# Patient Record
Sex: FEMALE | Race: WHITE | NOT HISPANIC OR LATINO | Employment: FULL TIME | URBAN - METROPOLITAN AREA
[De-identification: names, ages, dates, MRNs, and addresses within clinical notes are randomized per-mention and may not be internally consistent; named-entity substitution may affect disease eponyms.]

---

## 2017-03-08 ENCOUNTER — OFFICE VISIT (OUTPATIENT)
Dept: MEDICAL GROUP | Facility: MEDICAL CENTER | Age: 57
End: 2017-03-08
Payer: COMMERCIAL

## 2017-03-08 VITALS
HEART RATE: 87 BPM | HEIGHT: 67 IN | WEIGHT: 155.65 LBS | BODY MASS INDEX: 24.43 KG/M2 | DIASTOLIC BLOOD PRESSURE: 76 MMHG | RESPIRATION RATE: 14 BRPM | OXYGEN SATURATION: 98 % | TEMPERATURE: 99.1 F | SYSTOLIC BLOOD PRESSURE: 118 MMHG

## 2017-03-08 DIAGNOSIS — Z12.11 SCREEN FOR COLON CANCER: ICD-10-CM

## 2017-03-08 DIAGNOSIS — Z12.39 BREAST CANCER SCREENING, HIGH RISK PATIENT: ICD-10-CM

## 2017-03-08 DIAGNOSIS — Z90.710 HISTORY OF HYSTERECTOMY: ICD-10-CM

## 2017-03-08 DIAGNOSIS — Z00.00 PREVENTATIVE HEALTH CARE: ICD-10-CM

## 2017-03-08 DIAGNOSIS — R09.81 NASAL CONGESTION: ICD-10-CM

## 2017-03-08 PROCEDURE — 99204 OFFICE O/P NEW MOD 45 MIN: CPT | Performed by: PHYSICIAN ASSISTANT

## 2017-03-08 RX ORDER — BUDESONIDE 0.5 MG/2ML
INHALANT ORAL
Refills: 0 | COMMUNITY
Start: 2016-12-30 | End: 2018-09-27

## 2017-03-08 ASSESSMENT — PATIENT HEALTH QUESTIONNAIRE - PHQ9: CLINICAL INTERPRETATION OF PHQ2 SCORE: 0

## 2017-03-08 ASSESSMENT — PAIN SCALES - GENERAL: PAINLEVEL: 2=MINIMAL-SLIGHT

## 2017-03-08 NOTE — ASSESSMENT & PLAN NOTE
Has seen Dr. Stewart   Over the last year she has had 3-4 respiratory infections.  During one of him she went to see ENT- who prescribed Pulmicort through [facility] to help with her loss of smell from all the nasal congestion.  She is currently experiencing nasal congestion for the last one week.   Also has popping of the ears and feeling like she is under water.    Has had mild cough, increased mucus mostly clear but thick.    Denies fever, chills, ear pain or pressure, drainage, facial pain or pressure, sore throat, shortness breath, wheezing, chest pain, abdominal discomfort, diarrhea, nausea, vomiting  Has not been treating with anything.  Stated even the Pulmicort she has not tried yet because she was concerned that it is a steroid.

## 2017-03-08 NOTE — PROGRESS NOTES
Chief Complaint   Patient presents with   • Establish Care     HPI:   Nallely Herrera is a 56 y.o. female here to establish care and discuss her nasal congestion    mammo- has never had one.   Colonoscopy- 2006. Normal per pt it wsa for rectal bleeding that has since stopped.   Pap- 2014 normal. (Dr. Elizabet Baer)    [facility]. Last one was 5/2016    Nasal congestion  Has seen Dr. Stewart   Over the last year she has had 3-4 respiratory infections.  During one of him she went to see ENT- who prescribed Pulmicort through [facility] to help with her loss of smell from all the nasal congestion.  She is currently experiencing nasal congestion for the last one week.   Also has popping of the ears and feeling like she is under water.    Has had mild cough, increased mucus mostly clear but thick.    Denies fever, chills, ear pain or pressure, drainage, facial pain or pressure, sore throat, shortness breath, wheezing, chest pain, abdominal discomfort, diarrhea, nausea, vomiting  Has not been treating with anything.  Stated even the Pulmicort she has not tried yet because she was concerned that it is a steroid.      Current medicines (including changes today)  Current Outpatient Prescriptions   Medication Sig Dispense Refill   • budesonide (PULMICORT) 0.5 MG/2ML Suspension USE VIA NEBULIZER TWICE A DAY AS DIRECTED FOR 30 DAYS  0     No current facility-administered medications for this visit.     She  has no past medical history on file.  She  has past surgical history that includes abdominal hysterectomy total (2006).  Social History   Substance Use Topics   • Smoking status: Never Smoker    • Smokeless tobacco: Never Used   • Alcohol Use: Yes      Comment: x3 weekly     Social History     Social History Narrative   • No narrative on file     Family History   Problem Relation Age of Onset   • Stroke Mother    • Cancer Father      prostate ca   • Heart Disease Father      angina   • Diabetes Neg  "Hx    • Hypertension Neg Hx    • Hyperlipidemia Neg Hx      Family Status   Relation Status Death Age   • Mother     • Father     • Sister Alive    • Sister Alive    • Daughter Alive    • Son Alive        ROS  Constitutional: Negative for fever, chills, weight loss  HENT: Negative for ear pain, nosebleeds, + congestion, sore throat and neck pain.   Eyes: Negative for blurred vision.   Respiratory: + cough, sputum production, neg shortness of breath and wheezing.   Cardiovascular: Negative for chest pain, palpitations, orthopnea and leg swelling.   Gastrointestinal: Negative for heartburn, nausea, vomiting and abdominal pain.   Genitourinary: Negative for dysuria, urgency and frequency.   Musculoskeletal: Negative for myalgias, back pain and joint pain.   Skin: Negative for rash and itching.   Neurological: Negative for dizziness, tingling, tremors, sensory change, focal weakness and headaches.   Endo/Heme/Allergies: Does not bruise/bleed easily.   Psychiatric/Behavioral: Negative for depression, anxiety, or memory loss.   All other systems reviewed and are negative except as in HPI.     Objective:     Blood pressure 118/76, pulse 87, temperature 37.3 °C (99.1 °F), resp. rate 14, height 1.702 m (5' 7.01\"), weight 70.6 kg (155 lb 10.3 oz), SpO2 98 %, not currently breastfeeding. Body mass index is 24.37 kg/(m^2).  Physical Exam:    Constitutional: Alert, no distress.  Skin: Warm, dry, good turgor, no rashes in visible areas.  Eye: PERRLA, conjunctiva clear, lids normal.  ENMT: Nares patent, nasal turbinates pink and moist although thick clear mucus noted, TM both pearly gray although left with air-fluid level. No tenderness over sinuses. Lips without lesions, good dentition, oropharynx clear.  Neck: Trachea midline, no masses, no thyromegaly.  Respiratory: Unlabored respiratory effort, lungs clear to auscultation, no wheezes, no ronchi.  Cardiovascular: Normal S1, S2, no murmur, no edema.  Abdomen: " Soft, non-tender, no masses, no hepatosplenomegaly.  Psych: Alert and oriented x3, normal affect and mood.    Assessment and Plan:   The following treatment plan was discussed     1. History of hysterectomy  No treatment needed, continue following with Dr. Baer    2. Breast cancer screening, high risk patient  Mammogram ordered  - MA-SCREEN MAMMO W/CAD-BILAT    3. Screen for colon cancer  Referrals colonoscopy  - REFERRAL TO GI FOR COLONOSCOPY    4. Nasal congestion  Uncertain etiology could be URI or allergy related  Recommend starting nasal saline spray, Mucinex with increased water intake, and Pulmicort with [facility] as well as follow-up with ENT.   We discussed Pulmicort in depth and what type of steroid is, she seems much more happy and much more ease in order to start this medication.      Records requested. Would like to get labs from her work  Followup: Return in about 2 months (around 5/8/2017) for nasal congestion.           Please note that this dictation was created using voice recognition software. I have made every reasonable attempt to correct obvious errors, but I expect that there are errors of grammar and possibly content that I did not discover before finalizing the note.

## 2017-03-08 NOTE — MR AVS SNAPSHOT
"        Nallely Herrera   3/8/2017 9:40 AM   Office Visit   MRN: [id number]    Department:  [place] [address]   Dept Phone:  [phone]    Description:  Female : 1960   Provider:  Nicolle Crockett PA-C           Reason for Visit     Establish Care           Allergies as of 3/8/2017     No Known Allergies      You were diagnosed with     History of hysterectomy   [570021]       Breast cancer screening, high risk patient   [121435]       Screen for colon cancer   [725285]       Preventative health care   [799560]       Nasal congestion   [290948]         Vital Signs     Blood Pressure Pulse Temperature Respirations Height Weight    118/76 mmHg 87 37.3 °C (99.1 °F) 14 1.702 m (5' 7.01\") 70.6 kg (155 lb 10.3 oz)    Body Mass Index Oxygen Saturation Breastfeeding? Smoking Status          24.37 kg/m2 98% No Never Smoker         Basic Information     Date Of Birth Sex Race Ethnicity Preferred Language    1960 Female White Non- English      Your appointments     2017  3:40 PM   Established Patient with Nicolle Crockett PA-C   [facility]    [address]  [address]  [place] [address] [phone]           You will be receiving a confirmation call a few days before your appointment from our automated call confirmation system.              Problem List              ICD-10-CM Priority Class Noted - Resolved    History of hysterectomy Z90.710   3/8/2017 - Present    Preventative health care Z00.00   3/8/2017 - Present    Nasal congestion R09.81   3/8/2017 - Present      Health Maintenance        Date Due Completion Dates    PAP SMEAR 9/10/1983 ---    MAMMOGRAM 9/10/2002 ---    COLONOSCOPY 9/10/2012 ---    IMM INFLUENZA (1) 2016 ---    IMM DTaP/Tdap/Td Vaccine (2 - Td) 2022            Current Immunizations     Hepatitis A Vaccine, Adult 2012    Hepatitis B Vaccine Recombivax (Adol/Adult) 2012    Tdap Vaccine " 11/16/2012    Typhoid Vaccine (Oral) 11/16/2012      Below and/or attached are the medications your provider expects you to take. Review all of your home medications and newly ordered medications with your provider and/or pharmacist. Follow medication instructions as directed by your provider and/or pharmacist. Please keep your medication list with you and share with your provider. Update the information when medications are discontinued, doses are changed, or new medications (including over-the-counter products) are added; and carry medication information at all times in the event of emergency situations     Allergies:  No Known Allergies          Medications  Valid as of: March 08, 2017 - 10:34 AM    Generic Name Brand Name Tablet Size Instructions for use    Budesonide (Suspension) PULMICORT 0.5 MG/2ML USE VIA NEBULIZER TWICE A DAY AS DIRECTED FOR 30 DAYS        .                 Medicines prescribed today were sent to:     [id number] - [place], [place] - [address] [address]    Phone: [phone] Fax: [phone]    Open 24 Hours?: No      Medication refill instructions:       If your prescription bottle indicates you have medication refills left, it is not necessary to call your provider’s office. Please contact your pharmacy and they will refill your medication.    If your prescription bottle indicates you do not have any refills left, you may request refills at any time through one of the following ways: The online [employer] system (except Urgent Care), by calling your provider’s office, or by asking your pharmacy to contact your provider’s office with a refill request. Medication refills are processed only during regular business hours and may not be available until the next business day. Your provider may request additional information or to have a follow-up visit with you prior to refilling your medication.   *Please Note: Medication refills are  assigned a new Rx number when refilled electronically. Your pharmacy may indicate that no refills were authorized even though a new prescription for the same medication is available at the pharmacy. Please request the medicine by name with the pharmacy before contacting your provider for a refill.        Referral     A referral request has been sent to our patient care coordination department. Please allow 3-5 business days for us to process this request and contact you either by phone or mail. If you do not hear from us by the 5th business day, please call us at [phone].           Reachable Access Code: Activation code not generated  Current Reachable Status: Active

## 2017-06-09 ENCOUNTER — APPOINTMENT (OUTPATIENT)
Dept: MEDICAL GROUP | Facility: MEDICAL CENTER | Age: 57
End: 2017-06-09
Payer: COMMERCIAL

## 2018-03-01 ENCOUNTER — OFFICE VISIT (OUTPATIENT)
Dept: MEDICAL GROUP | Facility: LAB | Age: 58
End: 2018-03-01
Payer: COMMERCIAL

## 2018-03-01 VITALS
OXYGEN SATURATION: 94 % | HEART RATE: 84 BPM | HEIGHT: 67 IN | DIASTOLIC BLOOD PRESSURE: 62 MMHG | SYSTOLIC BLOOD PRESSURE: 110 MMHG | WEIGHT: 150 LBS | RESPIRATION RATE: 12 BRPM | BODY MASS INDEX: 23.54 KG/M2 | TEMPERATURE: 98.5 F

## 2018-03-01 DIAGNOSIS — R09.81 NASAL CONGESTION: ICD-10-CM

## 2018-03-01 PROCEDURE — 99214 OFFICE O/P EST MOD 30 MIN: CPT | Performed by: PHYSICIAN ASSISTANT

## 2018-03-01 RX ORDER — AMOXICILLIN AND CLAVULANATE POTASSIUM 875; 125 MG/1; MG/1
1 TABLET, FILM COATED ORAL 2 TIMES DAILY
Qty: 20 TAB | Refills: 0 | Status: SHIPPED | OUTPATIENT
Start: 2018-03-01 | End: 2018-03-11

## 2018-03-01 ASSESSMENT — PATIENT HEALTH QUESTIONNAIRE - PHQ9: CLINICAL INTERPRETATION OF PHQ2 SCORE: 0

## 2018-03-01 ASSESSMENT — PAIN SCALES - GENERAL: PAINLEVEL: NO PAIN

## 2018-03-01 NOTE — PROGRESS NOTES
"Subjective:     Chief Complaint   Patient presents with   • Runny Nose     on and off for that past few year, started again 3 months ago   • Nasal Drainage   • Ear Pain     Nallely Herrera is a 57 y.o. female here today for multiple problems as listed below    Had sx 1 year ago. Resolved.   Then 3 mo ago noticed hard mucus in nostril.   Was treating with nettipot, flonase, humidifier. Helped but still present. Also no longer using the medications.   Today has mild clear nasal congestion, drainage in throat, mild itchy watery eyes.   Will be leaving for [place] in 1 week and is concerned she will get sick or a sinus infection when gone.   Denies fever, chills, headache, ear popping or pain, facial pain, sore throat, cough, shortness of breath, chest pain, abdominal discomfort, nausea.   Pt recalls distant history of allergies when she was living in [place]    Current medicines (including changes today)  Current Outpatient Prescriptions   Medication Sig Dispense Refill   • amoxicillin-clavulanate (AUGMENTIN) 875-125 MG Tab Take 1 Tab by mouth 2 times a day for 10 days. 20 Tab 0   • budesonide (PULMICORT) 0.5 MG/2ML Suspension USE VIA NEBULIZER TWICE A DAY AS DIRECTED FOR 30 DAYS  0     No current facility-administered medications for this visit.      She  has no past medical history on file.    ROS   No chest pain, no shortness of breath, no abdominal pain       Objective:     Blood pressure 110/62, pulse 84, temperature 36.9 °C (98.5 °F), resp. rate 12, height 1.702 m (5' 7\"), weight 68 kg (150 lb), SpO2 94 %. Body mass index is 23.49 kg/m².   Physical Exam:  Alert, oriented in no acute distress.  Eye contact is good, speech goal directed, affect calm  HEENT: conjunctiva non-injected, sclera non-icteric, PERRL.  Pinna normal. TM pearly gray. Nares patent, nasal turbinates pink and boggy with clear mucus noted  Oral mucous membranes pink and moist with no lesions. pnd with clear mucus  Neck No adenopathy or masses " in the neck or supraclavicular regions.  Lungs: clear to auscultation bilaterally with good excursion.  CV: regular rate and rhythm.  Ext: no edema, color normal, peripheral pulses 2+, temperature normal        Assessment and Plan:   The following treatment plan was discussed     Nasal congestion  Most likely allergy related.  Recommend nasal saline spray, Flonase, Mucinex with lots of water, Zyrtec, humidifier.   Also discussed with slight to use Afrin although gave warning not to use more than 3 days.   Did give wait-and-see antibiotic for possible start of sinusitis.   Augmentin- discussed dosage and directions and side effects in depth.    Followup: Return if symptoms worsen or fail to improve.           Please note that this dictation was created using voice recognition software. I have made every reasonable attempt to correct obvious errors, but I expect that there are errors of grammar and possibly content that I did not discover before finalizing the note.

## 2018-09-27 ENCOUNTER — OFFICE VISIT (OUTPATIENT)
Dept: MEDICAL GROUP | Facility: MEDICAL CENTER | Age: 58
End: 2018-09-27
Payer: COMMERCIAL

## 2018-09-27 VITALS
SYSTOLIC BLOOD PRESSURE: 100 MMHG | WEIGHT: 147.6 LBS | TEMPERATURE: 100.2 F | BODY MASS INDEX: 23.17 KG/M2 | HEART RATE: 96 BPM | HEIGHT: 67 IN | OXYGEN SATURATION: 94 % | DIASTOLIC BLOOD PRESSURE: 54 MMHG

## 2018-09-27 DIAGNOSIS — R68.89 FLU-LIKE SYMPTOMS: ICD-10-CM

## 2018-09-27 PROBLEM — Z00.00 PREVENTATIVE HEALTH CARE: Status: RESOLVED | Noted: 2017-03-08 | Resolved: 2018-09-27

## 2018-09-27 PROBLEM — Z90.710 HISTORY OF HYSTERECTOMY: Status: RESOLVED | Noted: 2017-03-08 | Resolved: 2018-09-27

## 2018-09-27 PROBLEM — R09.81 NASAL CONGESTION: Status: RESOLVED | Noted: 2017-03-08 | Resolved: 2018-09-27

## 2018-09-27 LAB
FLUAV+FLUBV AG SPEC QL IA: NORMAL
INT CON NEG: NEGATIVE
INT CON POS: POSITIVE

## 2018-09-27 PROCEDURE — 99214 OFFICE O/P EST MOD 30 MIN: CPT | Performed by: PHYSICIAN ASSISTANT

## 2018-09-27 PROCEDURE — 87804 INFLUENZA ASSAY W/OPTIC: CPT | Performed by: PHYSICIAN ASSISTANT

## 2018-09-27 RX ORDER — CODEINE PHOSPHATE AND GUAIFENESIN 10; 100 MG/5ML; MG/5ML
5 SOLUTION ORAL EVERY 4 HOURS PRN
Qty: 200 ML | Refills: 0 | Status: ON HOLD | OUTPATIENT
Start: 2018-09-27 | End: 2018-10-03

## 2018-09-27 NOTE — LETTER
September 27, 2018        Re: Nallely Herrera          Patient see today for acute respiratory infection. Please excuse from work Tuesday through Friday as she is on treatment and recovers.        Thank you for your time,            Christy Underwood PA-C

## 2018-09-27 NOTE — PROGRESS NOTES
"Subjective:      Nallely Herrera is a 58 y.o. female who presents with Cold Symptoms (body aches, cough - productive, subj fevers)          Chief Complaint   Patient presents with   • Cold Symptoms     body aches, cough - productive, subj fevers       HPIPatient presents with 3 days of cough, body aches, fevers. Son was sick as well, got better within a few days. No recent travel. Not taking any otc meds. Not prone to sinusitis or bronchitis. Feeling a little better today. Cough is painful, non-productive.     ROSno dizziness or weakness. No vision change. No neck pain or stiffness. No n/v/d/c or abdominal pain. No rash or skin lesion. No joint redness or swelling. No urinary complaints    PMHX: negative for heart or lung disease. No diabetes or immune disorder  Meds: pulmicort  nkda  Non-smoker   Objective:     /54 (BP Location: Right arm, Patient Position: Sitting)   Pulse 96   Temp 37.9 °C (100.2 °F)   Ht 1.702 m (5' 7\")   Wt 67 kg (147 lb 9.6 oz)   SpO2 94%   BMI 23.12 kg/m²      Physical Exam   Constitutional: She is oriented to person, place, and time. She appears well-developed and well-nourished. No distress.   HENT:   Head: Normocephalic and atraumatic.   Right Ear: Hearing, tympanic membrane, external ear and ear canal normal.   Left Ear: Hearing, tympanic membrane, external ear and ear canal normal.   Nose: Nose normal. No mucosal edema. Right sinus exhibits no maxillary sinus tenderness and no frontal sinus tenderness. Left sinus exhibits no maxillary sinus tenderness and no frontal sinus tenderness.   Mouth/Throat: Uvula is midline, oropharynx is clear and moist and mucous membranes are normal.   Eyes: Conjunctivae are normal.   Neck: Normal range of motion. Neck supple.   Cardiovascular: Normal rate, regular rhythm and normal heart sounds.    Pulmonary/Chest: Effort normal and breath sounds normal.   Lymphadenopathy:     She has no cervical adenopathy.   Neurological: She is alert and " oriented to person, place, and time.   Skin: Skin is warm and dry. No rash noted. She is not diaphoretic. No pallor.   Psychiatric: She has a normal mood and affect. Her behavior is normal. Judgment and thought content normal.   Vitals reviewed.            POCT influenza negative  Assessment/Plan:     1. Flu-like symptoms  Likely viral illness, no sign/symptoms consistent with bacterial infection  - POCT Influenza A/B    Cheratussin, supportive care, rest. F/u as needed

## 2018-09-30 ENCOUNTER — APPOINTMENT (OUTPATIENT)
Dept: RADIOLOGY | Facility: MEDICAL CENTER | Age: 58
DRG: 193 | End: 2018-09-30
Attending: EMERGENCY MEDICINE
Payer: COMMERCIAL

## 2018-09-30 ENCOUNTER — HOSPITAL ENCOUNTER (INPATIENT)
Facility: MEDICAL CENTER | Age: 58
LOS: 3 days | DRG: 193 | End: 2018-10-03
Attending: EMERGENCY MEDICINE | Admitting: INTERNAL MEDICINE
Payer: COMMERCIAL

## 2018-09-30 DIAGNOSIS — J10.1 INFLUENZA A: ICD-10-CM

## 2018-09-30 DIAGNOSIS — J10.00 PNEUMONIA OF BOTH LUNGS DUE TO INFLUENZA A VIRUS, UNSPECIFIED PART OF LUNG: ICD-10-CM

## 2018-09-30 PROBLEM — J18.9 MULTIFOCAL PNEUMONIA: Status: ACTIVE | Noted: 2018-09-30

## 2018-09-30 PROBLEM — J96.01 ACUTE RESPIRATORY FAILURE WITH HYPOXEMIA (HCC): Status: ACTIVE | Noted: 2018-09-30

## 2018-09-30 LAB
ALBUMIN SERPL BCP-MCNC: 3.7 G/DL (ref 3.2–4.9)
ALBUMIN/GLOB SERPL: 1.1 G/DL
ALP SERPL-CCNC: 64 U/L (ref 30–99)
ALT SERPL-CCNC: 31 U/L (ref 2–50)
ANION GAP SERPL CALC-SCNC: 9 MMOL/L (ref 0–11.9)
AST SERPL-CCNC: 27 U/L (ref 12–45)
BASOPHILS # BLD AUTO: 0.5 % (ref 0–1.8)
BASOPHILS # BLD: 0.03 K/UL (ref 0–0.12)
BILIRUB SERPL-MCNC: 0.7 MG/DL (ref 0.1–1.5)
BNP SERPL-MCNC: 23 PG/ML (ref 0–100)
BUN SERPL-MCNC: 14 MG/DL (ref 8–22)
CALCIUM SERPL-MCNC: 8.6 MG/DL (ref 8.4–10.2)
CHLORIDE SERPL-SCNC: 103 MMOL/L (ref 96–112)
CO2 SERPL-SCNC: 22 MMOL/L (ref 20–33)
CREAT SERPL-MCNC: 0.76 MG/DL (ref 0.5–1.4)
EKG IMPRESSION: NORMAL
EOSINOPHIL # BLD AUTO: 0.04 K/UL (ref 0–0.51)
EOSINOPHIL NFR BLD: 0.7 % (ref 0–6.9)
ERYTHROCYTE [DISTWIDTH] IN BLOOD BY AUTOMATED COUNT: 38 FL (ref 35.9–50)
FLUAV RNA SPEC QL NAA+PROBE: POSITIVE
FLUAV+FLUBV AG SPEC QL IA: NORMAL
FLUBV RNA SPEC QL NAA+PROBE: NEGATIVE
GLOBULIN SER CALC-MCNC: 3.3 G/DL (ref 1.9–3.5)
GLUCOSE SERPL-MCNC: 96 MG/DL (ref 65–99)
HCT VFR BLD AUTO: 39 % (ref 37–47)
HGB BLD-MCNC: 13.5 G/DL (ref 12–16)
IMM GRANULOCYTES # BLD AUTO: 0.01 K/UL (ref 0–0.11)
IMM GRANULOCYTES NFR BLD AUTO: 0.2 % (ref 0–0.9)
LACTATE BLD-SCNC: 1.2 MMOL/L (ref 0.5–2)
LACTATE BLD-SCNC: 1.4 MMOL/L (ref 0.5–2)
LYMPHOCYTES # BLD AUTO: 1.75 K/UL (ref 1–4.8)
LYMPHOCYTES NFR BLD: 32.1 % (ref 22–41)
MCH RBC QN AUTO: 28.9 PG (ref 27–33)
MCHC RBC AUTO-ENTMCNC: 34.6 G/DL (ref 33.6–35)
MCV RBC AUTO: 83.5 FL (ref 81.4–97.8)
MONOCYTES # BLD AUTO: 0.59 K/UL (ref 0–0.85)
MONOCYTES NFR BLD AUTO: 10.8 % (ref 0–13.4)
NEUTROPHILS # BLD AUTO: 3.04 K/UL (ref 2–7.15)
NEUTROPHILS NFR BLD: 55.7 % (ref 44–72)
NRBC # BLD AUTO: 0 K/UL
NRBC BLD-RTO: 0 /100 WBC
PLATELET # BLD AUTO: 261 K/UL (ref 164–446)
PMV BLD AUTO: 10 FL (ref 9–12.9)
POTASSIUM SERPL-SCNC: 3.4 MMOL/L (ref 3.6–5.5)
PROT SERPL-MCNC: 7 G/DL (ref 6–8.2)
RBC # BLD AUTO: 4.67 M/UL (ref 4.2–5.4)
SIGNIFICANT IND 70042: NORMAL
SITE SITE: NORMAL
SODIUM SERPL-SCNC: 134 MMOL/L (ref 135–145)
SOURCE SOURCE: NORMAL
TROPONIN I SERPL-MCNC: <0.02 NG/ML (ref 0–0.04)
WBC # BLD AUTO: 5.5 K/UL (ref 4.8–10.8)

## 2018-09-30 PROCEDURE — 700117 HCHG RX CONTRAST REV CODE 255: Performed by: EMERGENCY MEDICINE

## 2018-09-30 PROCEDURE — 94760 N-INVAS EAR/PLS OXIMETRY 1: CPT

## 2018-09-30 PROCEDURE — 83605 ASSAY OF LACTIC ACID: CPT | Mod: 91

## 2018-09-30 PROCEDURE — 700105 HCHG RX REV CODE 258: Performed by: INTERNAL MEDICINE

## 2018-09-30 PROCEDURE — 304561 HCHG STAT O2

## 2018-09-30 PROCEDURE — 700105 HCHG RX REV CODE 258: Performed by: EMERGENCY MEDICINE

## 2018-09-30 PROCEDURE — 84145 PROCALCITONIN (PCT): CPT

## 2018-09-30 PROCEDURE — 96367 TX/PROPH/DG ADDL SEQ IV INF: CPT

## 2018-09-30 PROCEDURE — 93005 ELECTROCARDIOGRAM TRACING: CPT | Performed by: EMERGENCY MEDICINE

## 2018-09-30 PROCEDURE — 87502 INFLUENZA DNA AMP PROBE: CPT

## 2018-09-30 PROCEDURE — 83880 ASSAY OF NATRIURETIC PEPTIDE: CPT

## 2018-09-30 PROCEDURE — 71046 X-RAY EXAM CHEST 2 VIEWS: CPT

## 2018-09-30 PROCEDURE — 71275 CT ANGIOGRAPHY CHEST: CPT

## 2018-09-30 PROCEDURE — 87040 BLOOD CULTURE FOR BACTERIA: CPT | Mod: 91

## 2018-09-30 PROCEDURE — 80053 COMPREHEN METABOLIC PANEL: CPT

## 2018-09-30 PROCEDURE — 770006 HCHG ROOM/CARE - MED/SURG/GYN SEMI*

## 2018-09-30 PROCEDURE — 96365 THER/PROPH/DIAG IV INF INIT: CPT

## 2018-09-30 PROCEDURE — 84484 ASSAY OF TROPONIN QUANT: CPT

## 2018-09-30 PROCEDURE — 700102 HCHG RX REV CODE 250 W/ 637 OVERRIDE(OP): Performed by: INTERNAL MEDICINE

## 2018-09-30 PROCEDURE — 99285 EMERGENCY DEPT VISIT HI MDM: CPT

## 2018-09-30 PROCEDURE — 700111 HCHG RX REV CODE 636 W/ 250 OVERRIDE (IP): Performed by: INTERNAL MEDICINE

## 2018-09-30 PROCEDURE — 87400 INFLUENZA A/B EACH AG IA: CPT

## 2018-09-30 PROCEDURE — 700111 HCHG RX REV CODE 636 W/ 250 OVERRIDE (IP): Performed by: EMERGENCY MEDICINE

## 2018-09-30 PROCEDURE — 85025 COMPLETE CBC W/AUTO DIFF WBC: CPT

## 2018-09-30 PROCEDURE — 700111 HCHG RX REV CODE 636 W/ 250 OVERRIDE (IP)

## 2018-09-30 PROCEDURE — 99223 1ST HOSP IP/OBS HIGH 75: CPT | Performed by: INTERNAL MEDICINE

## 2018-09-30 PROCEDURE — 700101 HCHG RX REV CODE 250: Performed by: INTERNAL MEDICINE

## 2018-09-30 PROCEDURE — A9270 NON-COVERED ITEM OR SERVICE: HCPCS | Performed by: INTERNAL MEDICINE

## 2018-09-30 RX ORDER — PROMETHAZINE HYDROCHLORIDE 25 MG/1
12.5-25 TABLET ORAL EVERY 4 HOURS PRN
Status: DISCONTINUED | OUTPATIENT
Start: 2018-09-30 | End: 2018-10-03 | Stop reason: HOSPADM

## 2018-09-30 RX ORDER — PROMETHAZINE HYDROCHLORIDE 25 MG/1
12.5-25 SUPPOSITORY RECTAL EVERY 4 HOURS PRN
Status: DISCONTINUED | OUTPATIENT
Start: 2018-09-30 | End: 2018-10-03 | Stop reason: HOSPADM

## 2018-09-30 RX ORDER — CLARITHROMYCIN 500 MG/1
500 TABLET, COATED ORAL 2 TIMES DAILY
Qty: 20 TAB | Refills: 0 | Status: SHIPPED | OUTPATIENT
Start: 2018-09-30 | End: 2018-10-03

## 2018-09-30 RX ORDER — DOXYCYCLINE 100 MG/1
100 TABLET ORAL EVERY 12 HOURS
Status: DISCONTINUED | OUTPATIENT
Start: 2018-09-30 | End: 2018-10-02

## 2018-09-30 RX ORDER — ACETAMINOPHEN 325 MG/1
650 TABLET ORAL EVERY 6 HOURS PRN
Status: DISCONTINUED | OUTPATIENT
Start: 2018-09-30 | End: 2018-10-03 | Stop reason: HOSPADM

## 2018-09-30 RX ORDER — SODIUM CHLORIDE 9 MG/ML
1000 INJECTION, SOLUTION INTRAVENOUS CONTINUOUS
Status: DISCONTINUED | OUTPATIENT
Start: 2018-09-30 | End: 2018-10-03 | Stop reason: HOSPADM

## 2018-09-30 RX ORDER — POLYETHYLENE GLYCOL 3350 17 G/17G
1 POWDER, FOR SOLUTION ORAL
Status: DISCONTINUED | OUTPATIENT
Start: 2018-09-30 | End: 2018-10-03 | Stop reason: HOSPADM

## 2018-09-30 RX ORDER — AMOXICILLIN 250 MG
2 CAPSULE ORAL 2 TIMES DAILY
Status: DISCONTINUED | OUTPATIENT
Start: 2018-09-30 | End: 2018-10-03 | Stop reason: HOSPADM

## 2018-09-30 RX ORDER — AMPICILLIN AND SULBACTAM 2; 1 G/1; G/1
INJECTION, POWDER, FOR SOLUTION INTRAMUSCULAR; INTRAVENOUS
Status: DISPENSED
Start: 2018-09-30 | End: 2018-10-01

## 2018-09-30 RX ORDER — ONDANSETRON 2 MG/ML
4 INJECTION INTRAMUSCULAR; INTRAVENOUS EVERY 4 HOURS PRN
Status: DISCONTINUED | OUTPATIENT
Start: 2018-09-30 | End: 2018-10-03 | Stop reason: HOSPADM

## 2018-09-30 RX ORDER — SODIUM CHLORIDE AND POTASSIUM CHLORIDE 150; 900 MG/100ML; MG/100ML
INJECTION, SOLUTION INTRAVENOUS CONTINUOUS
Status: DISCONTINUED | OUTPATIENT
Start: 2018-09-30 | End: 2018-10-01

## 2018-09-30 RX ORDER — BISACODYL 10 MG
10 SUPPOSITORY, RECTAL RECTAL
Status: DISCONTINUED | OUTPATIENT
Start: 2018-09-30 | End: 2018-10-03 | Stop reason: HOSPADM

## 2018-09-30 RX ORDER — GUAIFENESIN 600 MG/1
1200 TABLET, EXTENDED RELEASE ORAL EVERY 12 HOURS
Status: DISCONTINUED | OUTPATIENT
Start: 2018-09-30 | End: 2018-10-02

## 2018-09-30 RX ORDER — IBUPROFEN 200 MG
400 TABLET ORAL EVERY 6 HOURS PRN
COMMUNITY
End: 2021-11-05

## 2018-09-30 RX ORDER — ONDANSETRON 4 MG/1
4 TABLET, ORALLY DISINTEGRATING ORAL EVERY 4 HOURS PRN
Status: DISCONTINUED | OUTPATIENT
Start: 2018-09-30 | End: 2018-10-03 | Stop reason: HOSPADM

## 2018-09-30 RX ADMIN — HYDROCODONE BITARTRATE AND HOMATROPINE METHYLBROMIDE 5 ML: 5; 1.5 SOLUTION ORAL at 18:27

## 2018-09-30 RX ADMIN — HYDROCODONE BITARTRATE AND HOMATROPINE METHYLBROMIDE 5 ML: 5; 1.5 SOLUTION ORAL at 22:23

## 2018-09-30 RX ADMIN — SODIUM CHLORIDE 3 G: 900 INJECTION INTRAVENOUS at 15:57

## 2018-09-30 RX ADMIN — POTASSIUM CHLORIDE AND SODIUM CHLORIDE: 900; 150 INJECTION, SOLUTION INTRAVENOUS at 18:28

## 2018-09-30 RX ADMIN — GUAIFENESIN 1200 MG: 600 TABLET, EXTENDED RELEASE ORAL at 18:27

## 2018-09-30 RX ADMIN — SODIUM CHLORIDE 3 G: 900 INJECTION INTRAVENOUS at 22:23

## 2018-09-30 RX ADMIN — IOHEXOL 75 ML: 350 INJECTION, SOLUTION INTRAVENOUS at 15:23

## 2018-09-30 RX ADMIN — DOXYCYCLINE 100 MG: 100 TABLET, FILM COATED ORAL at 18:27

## 2018-09-30 RX ADMIN — SODIUM CHLORIDE 1000 ML: 9 INJECTION, SOLUTION INTRAVENOUS at 13:08

## 2018-09-30 RX ADMIN — AZITHROMYCIN MONOHYDRATE 500 MG: 500 INJECTION, POWDER, LYOPHILIZED, FOR SOLUTION INTRAVENOUS at 16:39

## 2018-09-30 ASSESSMENT — ENCOUNTER SYMPTOMS
DIZZINESS: 1
NAUSEA: 1
SHORTNESS OF BREATH: 1
NERVOUS/ANXIOUS: 0
DEPRESSION: 0
VOMITING: 1
SORE THROAT: 1
WEAKNESS: 1
CHILLS: 1
DIAPHORESIS: 1
EYE DISCHARGE: 0
HEADACHES: 1
EYE PAIN: 0
ABDOMINAL PAIN: 1
MYALGIAS: 1
SPUTUM PRODUCTION: 0
WEIGHT LOSS: 1
FEVER: 1
EYE REDNESS: 0
COUGH: 1

## 2018-09-30 ASSESSMENT — LIFESTYLE VARIABLES
EVER_SMOKED: NEVER
ALCOHOL_USE: NO
SUBSTANCE_ABUSE: 0
EVER_SMOKED: NEVER

## 2018-09-30 ASSESSMENT — COPD QUESTIONNAIRES
HAVE YOU SMOKED AT LEAST 100 CIGARETTES IN YOUR ENTIRE LIFE: NO/DON'T KNOW
COPD SCREENING SCORE: 1
DURING THE PAST 4 WEEKS HOW MUCH DID YOU FEEL SHORT OF BREATH: NONE/LITTLE OF THE TIME
DO YOU EVER COUGH UP ANY MUCUS OR PHLEGM?: NO/ONLY WITH OCCASIONAL COLDS OR INFECTIONS

## 2018-09-30 ASSESSMENT — PAIN SCALES - GENERAL
PAINLEVEL_OUTOF10: 0
PAINLEVEL_OUTOF10: 2
PAINLEVEL_OUTOF10: 0
PAINLEVEL_OUTOF10: 0

## 2018-09-30 ASSESSMENT — PATIENT HEALTH QUESTIONNAIRE - PHQ9
1. LITTLE INTEREST OR PLEASURE IN DOING THINGS: NOT AT ALL
2. FEELING DOWN, DEPRESSED, IRRITABLE, OR HOPELESS: NOT AT ALL
SUM OF ALL RESPONSES TO PHQ9 QUESTIONS 1 AND 2: 0

## 2018-09-30 NOTE — ED TRIAGE NOTES
Pt is accompanied by her sister.  She has been experiencing worsening cough, with episodic N/V recurring since this past Monday.

## 2018-09-30 NOTE — ASSESSMENT & PLAN NOTE
Post influenza bacterial superinfection  Ct chest: 2.  Patchy areas of alveolar opacity in the left lower lobe, lingula, and right upper lobe consistent with pneumonia.  Tolerating room  Droplet Precautions while in the hospital  Continue abx de-escalate to oral Augmentin, doxycycline may be making her nauseated peer  Cultures negative to date

## 2018-09-30 NOTE — ED PROVIDER NOTES
ED Provider Note    CHIEF COMPLAINT  Chief Complaint   Patient presents with   • Cough        HPI  Nallely Herrera is a 58 y.o. female who presents to the ED with complaints of shortness of breath weakness dizziness.  Patient states about a week ago she started having some muscle aches and pains and just feeling like she was coming under with a cold.  The patient went to an urgent care couple of days ago in which she had an influenza done that was negative.  The patient states that she is got progressively worse to where now she is describing a cough is progressively worsening and now she coughs to the point where vomiting occurs.  Patient describes tactile fevers chills muscle aches and pain describe some shortness of breath is gradually getting worse over the past couple of days denies any other symptoms.    REVIEW OF SYSTEMS  See HPI for further details. All other systems are negative.     PAST MEDICAL HISTORY  History reviewed. No pertinent past medical history.    FAMILY HISTORY  Family History   Problem Relation Age of Onset   • Stroke Mother    • Cancer Father         prostate ca   • Heart Disease Father         angina   • Diabetes Neg Hx    • Hypertension Neg Hx    • Hyperlipidemia Neg Hx      Patient's family history has been discussed and is been found to be noncontributory to his present illness  SOCIAL HISTORY  Social History     Social History   • Marital status:      Spouse name: N/A   • Number of children: N/A   • Years of education: N/A     Social History Main Topics   • Smoking status: Never Smoker   • Smokeless tobacco: Never Used   • Alcohol use Yes      Comment: Occasionally   • Drug use: Yes     Types: Marijuana      Comment: cbd oil   • Sexual activity: Not Currently     Other Topics Concern   • Not on file     Social History Narrative   • No narrative on file      No primary care provider on file.      SURGICAL HISTORY  Past Surgical History:   Procedure Laterality Date   • ABDOMINAL  "HYSTERECTOMY TOTAL  2006    AUB- still has ovaries.        CURRENT MEDICATIONS  Home Medications     Reviewed by Jorge Jaimes (Pharmacy Tech) on 09/30/18 at 1324  Med List Status: Complete   Medication Last Dose Status   guaifenesin-codeine (ROBITUSSIN AC) Solution oral solution 9/30/2018 Active   ibuprofen (MOTRIN) 200 MG Tab 9/28/2018 Active            None    ALLERGIES  No Known Allergies    PHYSICAL EXAM  VITAL SIGNS: /77   Pulse 88 Comment: O2 restarted.  Temp 37.2 °C (98.9 °F)   Resp (!) 24 Comment: O2 restarted.  Ht 1.702 m (5' 7\")   Wt 66.2 kg (145 lb 15.1 oz)   SpO2 (!) 87% Comment: O2 restarted.  BMI 22.86 kg/m²    Pulse Oximetry was obtained. It showed a reading of Pulse Oximetry: 95 %.  I interpreted this as pulse oximetry in the room was 88-89% on room air 95% on 2 L via nasal cannula.     Constitutional: Well developed, Well nourished, No acute distress, Non-toxic appearance.   HENT: Normocephalic, Atraumatic, Bilateral external ears normal, bilateral tympanic membranes normal, Oropharynx dry mucous membranes, No oral exudates, Nose normal.   Eyes:  conjunctiva is normal, there are no signs of exudate.   Neck: Supple, no cervical lymphadenopathy, no meningeal signs..   Lymphatic: No lymphadenopathy noted.   Cardiovascular: Tachycardic rate regular rhythm without murmurs gallops or rubs  Thorax & Lungs: Diminished breath sounds with consolitary sounds in the right upper lobe region.  No rales or wheezes noted.  Abdomen: Soft nontender nondistended bowel sounds are present  Skin: Warm, Dry, No erythema,   Back: No tenderness, No CVA tenderness.   Extremities: Intact distal pulses, no clubbing, no cyanosis, no edema, nontender.  Negative Homans sign  Neurologic: Alert & oriented x 3, Normal motor function, Normal sensory function, No focal deficits noted.     EKG  Twelve-lead EKG interpreted below by myself    RADIOLOGY/PROCEDURES  CT-CTA CHEST PULMONARY ARTERY W/ RECONS   Final " Result      1.  No evidence of pulmonary embolism.      2.  Patchy areas of alveolar opacity in the left lower lobe, lingula, and right upper lobe consistent with pneumonia.               DX-CHEST-2 VIEWS   Final Result      Negative two views of the chest.          Results for orders placed or performed during the hospital encounter of 09/30/18   LACTIC ACID   Result Value Ref Range    Lactic Acid 1.4 0.5 - 2.0 mmol/L   CBC WITH DIFFERENTIAL   Result Value Ref Range    WBC 5.5 4.8 - 10.8 K/uL    RBC 4.67 4.20 - 5.40 M/uL    Hemoglobin 13.5 12.0 - 16.0 g/dL    Hematocrit 39.0 37.0 - 47.0 %    MCV 83.5 81.4 - 97.8 fL    MCH 28.9 27.0 - 33.0 pg    MCHC 34.6 33.6 - 35.0 g/dL    RDW 38.0 35.9 - 50.0 fL    Platelet Count 261 164 - 446 K/uL    MPV 10.0 9.0 - 12.9 fL    Neutrophils-Polys 55.70 44.00 - 72.00 %    Lymphocytes 32.10 22.00 - 41.00 %    Monocytes 10.80 0.00 - 13.40 %    Eosinophils 0.70 0.00 - 6.90 %    Basophils 0.50 0.00 - 1.80 %    Immature Granulocytes 0.20 0.00 - 0.90 %    Nucleated RBC 0.00 /100 WBC    Neutrophils (Absolute) 3.04 2.00 - 7.15 K/uL    Lymphs (Absolute) 1.75 1.00 - 4.80 K/uL    Monos (Absolute) 0.59 0.00 - 0.85 K/uL    Eos (Absolute) 0.04 0.00 - 0.51 K/uL    Baso (Absolute) 0.03 0.00 - 0.12 K/uL    Immature Granulocytes (abs) 0.01 0.00 - 0.11 K/uL    NRBC (Absolute) 0.00 K/uL   COMP METABOLIC PANEL   Result Value Ref Range    Sodium 134 (L) 135 - 145 mmol/L    Potassium 3.4 (L) 3.6 - 5.5 mmol/L    Chloride 103 96 - 112 mmol/L    Co2 22 20 - 33 mmol/L    Anion Gap 9.0 0.0 - 11.9    Glucose 96 65 - 99 mg/dL    Bun 14 8 - 22 mg/dL    Creatinine 0.76 0.50 - 1.40 mg/dL    Calcium 8.6 8.4 - 10.2 mg/dL    AST(SGOT) 27 12 - 45 U/L    ALT(SGPT) 31 2 - 50 U/L    Alkaline Phosphatase 64 30 - 99 U/L    Total Bilirubin 0.7 0.1 - 1.5 mg/dL    Albumin 3.7 3.2 - 4.9 g/dL    Total Protein 7.0 6.0 - 8.2 g/dL    Globulin 3.3 1.9 - 3.5 g/dL    A-G Ratio 1.1 g/dL   TROPONIN   Result Value Ref Range    Troponin I  <0.02 0.00 - 0.04 ng/mL   BNP   Result Value Ref Range    B Natriuretic Peptide 23 0 - 100 pg/mL   INFLUENZA RAPID   Result Value Ref Range    Significant Indicator NEG     Source RESP     Site Nasopharyngeal     Rapid Influenza A-B       Negative for Influenza A and Influenza B antigens.  Infection due to influenza A or B cannot be ruled out  since the antigen present in the specimen may be below the  detection limit of the test. Culture confirmation of  negative samples is recommended.     INFLUENZA A/B BY PCR   Result Value Ref Range    Influenza virus A RNA POSITIVE (A) Negative    Influenza virus B, PCR Negative Negative   ESTIMATED GFR   Result Value Ref Range    GFR If African American >60 >60 mL/min/1.73 m 2    GFR If Non African American >60 >60 mL/min/1.73 m 2   EKG   Result Value Ref Range    Report       [facility] Emergency Dept.    Test Date:  2018  Pt Name:    KIAN WHYTE             Department[facility]  MRN:        [id number]                      [facility]   Gender:     Female                       Technician: HRR  :        [id number]                   Requested By:JUAN DAVID LAWRENCE  Order #:    [id number]                    Reading MD: JUAN DAVID LAWRENCE MD    Measurements  Intervals                                Axis  Rate:       84                           P:          36  MO:         141                          QRS:        25  QRSD:       92                           T:          62  QT:         360  QTc:        426    Interpretive Statements  Sinus rhythm  No previous ECG available for comparison  nomral appearing ECG  Electronically Signed On 2018 13:18:28 PDT by JUAN DAVID LAWRENCE MD             COURSE & MEDICAL DECISION MAKING  Pertinent Labs & Imaging studies reviewed. (See chart for details)  Patient presents the ED for evaluation.  Clinically patient's oxygen saturations were about 89-91% upon initial evaluation.  She states that she has been  vomiting so I did give her a liter bolus of normal saline because she does appear to be dehydrated she is tachycardic dry mucous membranes.  After fluid hydration she states she is feeling improved however oxygen saturation is dropped to 87%.  Initial chest x-ray looks normal however on clinical evaluation I do feel that she most likely has a good solitary sounds in the left upper lung fields so I did do a CT scan which confirms patchy infiltrates bilateral pneumonia.  PCR influenza did come back as positive so I do for the patient most likely has pneumonia secondary to influenza.  I did start the patient on empiric antibiotics.  After continued observation she still is remaining around 87-89% on room air and at this point she does not feel well enough to be discharge so I will admit the patient for further treatment and care.    FINAL IMPRESSION  1. Influenza A    2. Pneumonia of both lungs due to influenza A virus, unspecified part of lung             Electronically signed by: Dexter Stewart, 9/30/2018 12:53 PM

## 2018-09-30 NOTE — ASSESSMENT & PLAN NOTE
Well past 48-hour window for Tamiflu treatment at the time of admission.  Continue isolation while in the hospital  Supportive treatment

## 2018-09-30 NOTE — H&P
Hospital Medicine History & Physical Note    Date of Service  9/30/2018    Primary Care Physician  No primary care provider on file.    Consultants  none    Code Status  Full    Chief Complaint  Weak    History of Presenting Illness  58 y.o. female recently diagnosed with influenza A -not treated who presented 9/30/2018 with worsening cough, weakness. She first became ill on Monday with feverish feeling, chills, sweats, achy, mild headache, sore throat, rhinitis and congestion.  She had persistent hacking cough and was seen as an outpatient Thursday, rapid flu was negative and she was given Robitussin-AC and Motrin, subsequent PCR has come back positive.  Rather than improving however patient has continued to worsen, he is unable to keep down food due to the persistent coughing she vomits-she has become weak and dizzy, is unable to be up and about for more than a couple of minutes before she is overwhelmingly fatigued and nauseated.  She contacted her sister who is a physician and was brought here.    Review of Systems  Review of Systems   Constitutional: Positive for chills, diaphoresis, fever, malaise/fatigue and weight loss.   HENT: Positive for congestion and sore throat.    Eyes: Negative for pain, discharge and redness.   Respiratory: Positive for cough and shortness of breath. Negative for sputum production.    Cardiovascular: Negative for chest pain.   Gastrointestinal: Positive for abdominal pain (2/2 coughing), nausea and vomiting (2/2 coughing).   Genitourinary: Negative for dysuria and urgency.   Musculoskeletal: Positive for myalgias.   Skin: Negative for itching and rash.   Neurological: Positive for dizziness, weakness and headaches.   Psychiatric/Behavioral: Negative for depression and substance abuse. The patient is not nervous/anxious.        Past Medical History  Denies    Surgical History   has a past surgical history that includes abdominal hysterectomy total (2006). States partial  hyst    Family History  family history includes Cancer in her father; Heart Disease in her father; Stroke in her mother.  No diabetes    Social History   reports that she has never smoked. She has never used smokeless tobacco. She reports that she drinks alcohol. She reports that she uses drugs, including Marijuana.  Social smoker    Allergies  No Known Allergies    Medications  Prior to Admission Medications   Prescriptions Last Dose Informant Patient Reported? Taking?   guaifenesin-codeine (ROBITUSSIN AC) Solution oral solution 9/30/2018 at 1230 Patient No No   Sig: Take 5 mL by mouth every four hours as needed for Cough for up to 10 days.   ibuprofen (MOTRIN) 200 MG Tab 9/28/2018 at PRN Patient Yes Yes   Sig: Take 400 mg by mouth every 6 hours as needed (Pain).      Facility-Administered Medications: None       Physical Exam  Temp:  [37.2 °C (98.9 °F)] 37.2 °C (98.9 °F)  Pulse:  [] 88  Resp:  [17-24] 24  BP: (110)/(77) 110/77    Physical Exam   Constitutional: She is oriented to person, place, and time. She appears well-developed and well-nourished. She appears distressed (Mild due to cough).   HENT:   Head: Normocephalic and atraumatic.   Right Ear: External ear normal.   Left Ear: External ear normal.   Mouth/Throat: No oropharyngeal exudate.   Dry lips mild pharyngeal erythema   Eyes: Pupils are equal, round, and reactive to light. Conjunctivae and EOM are normal. Right eye exhibits no discharge. Left eye exhibits no discharge. No scleral icterus.   Neck: Neck supple.   Cardiovascular: Normal rate and regular rhythm.    Pulmonary/Chest: Breath sounds normal. No respiratory distress (Mild due to coughing). She has no wheezes. She has no rales. She exhibits no tenderness.   Impaired due to persistent hacking cough  No egophony   Abdominal: She exhibits no distension and no mass. There is tenderness (Minimal). There is no rebound and no guarding.   firm due to muscle tension from persistent  cough  Diminished bowel sound   Musculoskeletal: She exhibits no edema or tenderness.   Neurological: She is alert and oriented to person, place, and time. No cranial nerve deficit.   Skin: Skin is warm and dry. She is not diaphoretic.   Psychiatric: She has a normal mood and affect.   Nursing note and vitals reviewed.      Laboratory:  Recent Labs      09/30/18   1310   WBC  5.5   RBC  4.67   HEMOGLOBIN  13.5   HEMATOCRIT  39.0   MCV  83.5   MCH  28.9   MCHC  34.6   RDW  38.0   PLATELETCT  261   MPV  10.0     Recent Labs      09/30/18   1310   SODIUM  134*   POTASSIUM  3.4*   CHLORIDE  103   CO2  22   GLUCOSE  96   BUN  14   CREATININE  0.76   CALCIUM  8.6     Recent Labs      09/30/18   1310   ALTSGPT  31   ASTSGOT  27   ALKPHOSPHAT  64   TBILIRUBIN  0.7   GLUCOSE  96         Recent Labs      09/30/18   1310   BNPBTYPENAT  23         Recent Labs      09/30/18   1310   TROPONINI  <0.02       Urinalysis:    No results found     Imaging:  CT-CTA CHEST PULMONARY ARTERY W/ RECONS   Final Result      1.  No evidence of pulmonary embolism.      2.  Patchy areas of alveolar opacity in the left lower lobe, lingula, and right upper lobe consistent with pneumonia.               DX-CHEST-2 VIEWS   Final Result      Negative two views of the chest.            Assessment/Plan:  I anticipate this patient will require at least two midnights for appropriate medical management, necessitating inpatient admission.    Multifocal pneumonia   Assessment & Plan    Post influenza syndrome  Cover for anaerobes, staph  Unasyn, doxycycline ordered        Influenza A   Assessment & Plan    Well past 48-hour window for Tamiflu treatment  Needs isolation  Supportive treatment          Acute respiratory failure with hypoxemia (HCC)   Assessment & Plan    secondary to post influenza pneumonia  RT protocol, antibiotics, mucolytic            VTE prophylaxis: Lovenox

## 2018-09-30 NOTE — ASSESSMENT & PLAN NOTE
Secondary to post influenza pneumonia  Tolerating room air but still having a lot of coughing.  Mucinex is making her nauseated, changed to Tessalon Perles.  Respiratory therapy protocol

## 2018-10-01 LAB
ANION GAP SERPL CALC-SCNC: 1 MMOL/L (ref 0–11.9)
BASOPHILS # BLD AUTO: 0 % (ref 0–1.8)
BASOPHILS # BLD: 0 K/UL (ref 0–0.12)
BUN SERPL-MCNC: 7 MG/DL (ref 8–22)
CALCIUM SERPL-MCNC: 8.2 MG/DL (ref 8.4–10.2)
CHLORIDE SERPL-SCNC: 110 MMOL/L (ref 96–112)
CO2 SERPL-SCNC: 23 MMOL/L (ref 20–33)
CREAT SERPL-MCNC: 0.68 MG/DL (ref 0.5–1.4)
EOSINOPHIL # BLD AUTO: 0.09 K/UL (ref 0–0.51)
EOSINOPHIL NFR BLD: 2 % (ref 0–6.9)
ERYTHROCYTE [DISTWIDTH] IN BLOOD BY AUTOMATED COUNT: 40.3 FL (ref 35.9–50)
GLUCOSE SERPL-MCNC: 96 MG/DL (ref 65–99)
HCT VFR BLD AUTO: 33.2 % (ref 37–47)
HGB BLD-MCNC: 11.1 G/DL (ref 12–16)
LYMPHOCYTES # BLD AUTO: 2.75 K/UL (ref 1–4.8)
LYMPHOCYTES NFR BLD: 64 % (ref 22–41)
MANUAL DIFF BLD: NORMAL
MCH RBC QN AUTO: 28.9 PG (ref 27–33)
MCHC RBC AUTO-ENTMCNC: 33.4 G/DL (ref 33.6–35)
MCV RBC AUTO: 86.5 FL (ref 81.4–97.8)
MONOCYTES # BLD AUTO: 0.22 K/UL (ref 0–0.85)
MONOCYTES NFR BLD AUTO: 5 % (ref 0–13.4)
NEUTROPHILS # BLD AUTO: 1.25 K/UL (ref 2–7.15)
NEUTROPHILS NFR BLD: 28 % (ref 44–72)
NEUTS BAND NFR BLD MANUAL: 1 % (ref 0–10)
NRBC # BLD AUTO: 0 K/UL
NRBC BLD-RTO: 0 /100 WBC
PLATELET # BLD AUTO: 217 K/UL (ref 164–446)
PLATELET BLD QL SMEAR: NORMAL
PMV BLD AUTO: 10.2 FL (ref 9–12.9)
POTASSIUM SERPL-SCNC: 4.1 MMOL/L (ref 3.6–5.5)
PROCALCITONIN SERPL-MCNC: <0.05 NG/ML
RBC # BLD AUTO: 3.84 M/UL (ref 4.2–5.4)
RBC BLD AUTO: NORMAL
SODIUM SERPL-SCNC: 134 MMOL/L (ref 135–145)
VARIANT LYMPHS BLD QL SMEAR: NORMAL
WBC # BLD AUTO: 4.3 K/UL (ref 4.8–10.8)

## 2018-10-01 PROCEDURE — 700102 HCHG RX REV CODE 250 W/ 637 OVERRIDE(OP): Performed by: INTERNAL MEDICINE

## 2018-10-01 PROCEDURE — 700111 HCHG RX REV CODE 636 W/ 250 OVERRIDE (IP): Performed by: INTERNAL MEDICINE

## 2018-10-01 PROCEDURE — 85007 BL SMEAR W/DIFF WBC COUNT: CPT

## 2018-10-01 PROCEDURE — 700105 HCHG RX REV CODE 258: Performed by: INTERNAL MEDICINE

## 2018-10-01 PROCEDURE — 770006 HCHG ROOM/CARE - MED/SURG/GYN SEMI*

## 2018-10-01 PROCEDURE — 85027 COMPLETE CBC AUTOMATED: CPT

## 2018-10-01 PROCEDURE — A9270 NON-COVERED ITEM OR SERVICE: HCPCS | Performed by: INTERNAL MEDICINE

## 2018-10-01 PROCEDURE — 80048 BASIC METABOLIC PNL TOTAL CA: CPT

## 2018-10-01 PROCEDURE — 99233 SBSQ HOSP IP/OBS HIGH 50: CPT | Performed by: HOSPITALIST

## 2018-10-01 PROCEDURE — 700101 HCHG RX REV CODE 250: Performed by: INTERNAL MEDICINE

## 2018-10-01 RX ORDER — SODIUM CHLORIDE 9 MG/ML
INJECTION, SOLUTION INTRAVENOUS CONTINUOUS
Status: DISCONTINUED | OUTPATIENT
Start: 2018-10-01 | End: 2018-10-03 | Stop reason: HOSPADM

## 2018-10-01 RX ADMIN — HYDROCODONE BITARTRATE AND HOMATROPINE METHYLBROMIDE 5 ML: 5; 1.5 SOLUTION ORAL at 08:37

## 2018-10-01 RX ADMIN — GUAIFENESIN 1200 MG: 600 TABLET, EXTENDED RELEASE ORAL at 05:32

## 2018-10-01 RX ADMIN — DOXYCYCLINE 100 MG: 100 TABLET, FILM COATED ORAL at 05:32

## 2018-10-01 RX ADMIN — HYDROCODONE BITARTRATE AND HOMATROPINE METHYLBROMIDE 5 ML: 5; 1.5 SOLUTION ORAL at 18:11

## 2018-10-01 RX ADMIN — SODIUM CHLORIDE: 9 INJECTION, SOLUTION INTRAVENOUS at 21:21

## 2018-10-01 RX ADMIN — POTASSIUM CHLORIDE AND SODIUM CHLORIDE: 900; 150 INJECTION, SOLUTION INTRAVENOUS at 03:10

## 2018-10-01 RX ADMIN — DOXYCYCLINE 100 MG: 100 TABLET, FILM COATED ORAL at 18:11

## 2018-10-01 RX ADMIN — HYDROCODONE BITARTRATE AND HOMATROPINE METHYLBROMIDE 5 ML: 5; 1.5 SOLUTION ORAL at 03:10

## 2018-10-01 RX ADMIN — GUAIFENESIN 1200 MG: 600 TABLET, EXTENDED RELEASE ORAL at 18:11

## 2018-10-01 RX ADMIN — POTASSIUM CHLORIDE AND SODIUM CHLORIDE: 900; 150 INJECTION, SOLUTION INTRAVENOUS at 12:30

## 2018-10-01 RX ADMIN — SODIUM CHLORIDE 3 G: 900 INJECTION INTRAVENOUS at 18:11

## 2018-10-01 RX ADMIN — HYDROCODONE BITARTRATE AND HOMATROPINE METHYLBROMIDE 5 ML: 5; 1.5 SOLUTION ORAL at 12:37

## 2018-10-01 RX ADMIN — SODIUM CHLORIDE 3 G: 900 INJECTION INTRAVENOUS at 05:32

## 2018-10-01 RX ADMIN — SODIUM CHLORIDE 3 G: 900 INJECTION INTRAVENOUS at 12:30

## 2018-10-01 ASSESSMENT — ENCOUNTER SYMPTOMS
DEPRESSION: 0
COUGH: 1
VOMITING: 0
DOUBLE VISION: 0
NECK PAIN: 0
CLAUDICATION: 0
MYALGIAS: 1
CHILLS: 1
WEAKNESS: 1
DIARRHEA: 0
ABDOMINAL PAIN: 0
TINGLING: 0
HEADACHES: 0
FEVER: 1
NAUSEA: 0
SHORTNESS OF BREATH: 1
BACK PAIN: 0
PHOTOPHOBIA: 0
DIZZINESS: 0
BLURRED VISION: 0

## 2018-10-01 ASSESSMENT — PAIN SCALES - GENERAL
PAINLEVEL_OUTOF10: 0

## 2018-10-01 ASSESSMENT — PATIENT HEALTH QUESTIONNAIRE - PHQ9
2. FEELING DOWN, DEPRESSED, IRRITABLE, OR HOPELESS: NOT AT ALL
1. LITTLE INTEREST OR PLEASURE IN DOING THINGS: NOT AT ALL
SUM OF ALL RESPONSES TO PHQ9 QUESTIONS 1 AND 2: 0

## 2018-10-01 ASSESSMENT — LIFESTYLE VARIABLES: SUBSTANCE_ABUSE: 0

## 2018-10-01 NOTE — PROGRESS NOTES
Pt resting in bed. Denies pain. Generalized weakness. C/o dizziness when ambulating at times, steady gait. Good safety awareness. IV abx continue for PNA. PO intake improving. VSS. Will continue to monitor.

## 2018-10-01 NOTE — PROGRESS NOTES
[facility] Hospitalist Progress Note    Date of Service: 10/1/2018    Chief Complaint  58 y.o. female admitted 2018 with post influenza Multifocal PNA    Interval Problem Update  Patient is very pleasant , still having some SOB, myalgias, and on NC. Otherwise no complaints.    Consultants/Specialty  none    Disposition  Home when medically cleared        Review of Systems   Constitutional: Positive for chills, fever and malaise/fatigue.   HENT: Positive for congestion. Negative for ear discharge, hearing loss and nosebleeds.    Eyes: Negative for blurred vision, double vision and photophobia.   Respiratory: Positive for cough and shortness of breath.    Cardiovascular: Negative for chest pain, claudication and leg swelling.   Gastrointestinal: Negative for abdominal pain, diarrhea, nausea and vomiting.   Genitourinary: Negative for dysuria and urgency.   Musculoskeletal: Positive for myalgias. Negative for back pain and neck pain.   Skin: Negative for itching and rash.   Neurological: Positive for weakness. Negative for dizziness, tingling and headaches.   Psychiatric/Behavioral: Negative for depression, substance abuse and suicidal ideas.      Physical Exam  Laboratory/Imaging   Hemodynamics  Temp (24hrs), Av.1 °C (98.8 °F), Min:36.8 °C (98.3 °F), Max:37.3 °C (99.2 °F)   Temperature: 37 °C (98.6 °F)  Pulse  Av.1  Min: 73  Max: 100 Heart Rate (Monitored): 75  Blood Pressure: (!) 89/66, NIBP: 105/65      Respiratory      Respiration: 18, Pulse Oximetry: 95 %, O2 Daily Delivery Respiratory : Nasal Cannula     Work Of Breathing / Effort: Moderate  RUL Breath Sounds: Diminished, RML Breath Sounds: Diminished, RLL Breath Sounds: Diminished, JOSAFAT Breath Sounds: Diminished, LLL Breath Sounds: Diminished    Fluids    Intake/Output Summary (Last 24 hours) at 10/01/18 0830  Last data filed at 10/01/18 0645   Gross per 24 hour   Intake          2735.42 ml   Output                0 ml   Net          2735.42 ml        Nutrition  Orders Placed This Encounter   Procedures   • Diet Order Regular     Standing Status:   Standing     Number of Occurrences:   1     Order Specific Question:   Diet:     Answer:   Regular [1]     Physical Exam   Constitutional: She is oriented to person, place, and time. She appears well-developed and well-nourished. No distress.   HENT:   Head: Normocephalic and atraumatic.   Mouth/Throat: Oropharynx is clear and moist. No oropharyngeal exudate.   Eyes: Pupils are equal, round, and reactive to light. Conjunctivae and EOM are normal. No scleral icterus.   Neck: Neck supple. No JVD present. No thyromegaly present.   Cardiovascular: Normal rate and intact distal pulses.    No murmur heard.  Pulmonary/Chest: Effort normal. No respiratory distress. She has no wheezes. She has no rales.   diminished   Abdominal: Soft. Bowel sounds are normal. She exhibits no distension. There is no tenderness. There is no rebound.   Musculoskeletal: Normal range of motion. She exhibits no edema or tenderness.   Lymphadenopathy:     She has no cervical adenopathy.   Neurological: She is alert and oriented to person, place, and time. She has normal reflexes. She exhibits normal muscle tone.   Skin: Skin is warm and dry. No rash noted. No erythema. No pallor.   Psychiatric: She has a normal mood and affect. Her behavior is normal. Judgment and thought content normal.       Recent Labs      09/30/18   1310  10/01/18   0527   WBC  5.5  4.3*   RBC  4.67  3.84*   HEMOGLOBIN  13.5  11.1*   HEMATOCRIT  39.0  33.2*   MCV  83.5  86.5   MCH  28.9  28.9   MCHC  34.6  33.4*   RDW  38.0  40.3   PLATELETCT  261  217   MPV  10.0  10.2     Recent Labs      09/30/18   1310  10/01/18   0527   SODIUM  134*  134*   POTASSIUM  3.4*  4.1   CHLORIDE  103  110   CO2  22  23   GLUCOSE  96  96   BUN  14  7*   CREATININE  0.76  0.68   CALCIUM  8.6  8.2*         Recent Labs      09/30/18   1310   BNPBTYPENAT  23              Assessment/Plan      Multifocal pneumonia   Assessment & Plan    Post influenza syndrome  Ct chest: 2.  Patchy areas of alveolar opacity in the left lower lobe, lingula, and right upper lobe consistent with pneumonia.  Continue O2 supplementation, wean as tolerated  Droplet Precautions  Continue abx   Cultures pending          Influenza A   Assessment & Plan    Well past 48-hour window for Tamiflu treatment  Continue isolation  Supportive treatment          Acute respiratory failure with hypoxemia (HCC)   Assessment & Plan    secondary to post influenza pneumonia  RT protocol, antibiotics, mucolytic          Quality-Core Measures   Reviewed items::  Labs reviewed, Medications reviewed and Radiology images reviewed  Mitchell catheter::  No Mitchell  Antibiotics:  Treating active infection/contamination beyond 24 hours perioperative coverage        Discussed plan of care with patient and nursing during morning rounds

## 2018-10-01 NOTE — FLOWSHEET NOTE
09/30/18 2003   Type of Assessment   Assessment Yes   Patient History   Pulmonary Diagnosis none   Surgical Procedures none   Home O2 No   Home Treatments/Frequency No   COPD Risk Screening   Do you have a history of COPD? No   COPD Population Screener   During the past 4 weeks, how much did you feel short of breath? 0   Do you ever cough up any mucus or phlegm? 0   In the past 12 months, you do less than you used to because of your breathing problems 0   Have you smoked at least 100 cigarettes in your entire life? 0   How old are you? 1   COPD Screening Score 1   Smoking History   Have you ever smoked Never   Level Of Consciousness   Level of Consciousness Alert   Respiratory WDL   Respiratory (WDL) X   Chest Exam   Respiration 18   Pulse 83   Breath Sounds   RUL Breath Sounds Diminished   RML Breath Sounds Diminished   RLL Breath Sounds Diminished   JOSAFAT Breath Sounds Diminished   LLL Breath Sounds Diminished   Secretions   Cough Dry;Strong;Non Productive   Oximetry   #Pulse Oximetry (Single Determination) Yes   Oxygen   Pulse Oximetry 96 %   O2 (LPM) 2   O2 Daily Delivery Respiratory  Nasal Cannula   Protocol Pathways   Protocol Pathways Yes   Bronchodilator Protocol   Med Order With RCP No   Is this an exacerbation of COPD/Asthma? No   Bronchodilator Indications Strong Subjective / Objective Improvement   Breath Sounds Criteria 1    Benefit Criteria 0    Pulse Criteria 0    Respiratory Rate Criteria 1    S.O.B. Criteria 1    Criteria Total 3   Point Values 0-3 - PRN   O2 Protocol   O2 Protocol Indications Room Air SpO2 Less Than 90%   PRN oxygen initiated for: SpO2 89-92% on Room Air   O2 Protocol Goals/Outcome Return to home Oxygen therapy baseline

## 2018-10-01 NOTE — PROGRESS NOTES
1905: Bedside report received from MERRICK Grider. Patient resting comfortably in bed. No questions/concerns/needs verbalized at this time. Standard falls precautions remain in place.    2015: Rounded on patient, sitting up in bed, watching TV. Alert and oriented x 4. VSS and afebrile. SpO2 WNL on 2L O2 per NC. LS diminished throughtout. Dry, nonproductive cough. Denies pain/discomfort at this time. IV patent and NS + 20 KCL infusing at 100mL/hr. Ambulating in room independently with steady gait. Plan of care discussed and questions answered. Needs addressed. No further questions or concerns at this time. Droplet precautions maintained. Will continue to monitor.     0235: BP noted to be 89/55. Patient resting in bed, asymptomatic. HR 70s and regular. Dr. Brizuela paged to notify.    0248: Return call received from Dr. Brizuela. Stated to reassess BP in 1 hour. If remains low, will give 500mL NS bolus.    0317: BP 92/58, MAP 69. Patient remains asymptomatic. No bolus per MD. Will continue to monitor at this time.

## 2018-10-01 NOTE — CARE PLAN
Problem: Infection  Goal: Will remain free from infection  Outcome: PROGRESSING AS EXPECTED  Afebrile overnight. Remains on scheduled IV and PO antibiotics.     Problem: Respiratory:  Goal: Respiratory status will improve  Outcome: PROGRESSING AS EXPECTED  No adventitious LS noted overnight, but remain diminished throughout. Dyspneic on exertion, denies SOB at rest. Dry,   nonproductive cough; alleviated with PRN Hydrocodone-homatropine. SpO2 WNL overnight on 2L O2 per NC.

## 2018-10-02 PROBLEM — R11.2 NAUSEA AND VOMITING: Status: ACTIVE | Noted: 2018-10-02

## 2018-10-02 PROCEDURE — 700111 HCHG RX REV CODE 636 W/ 250 OVERRIDE (IP): Performed by: INTERNAL MEDICINE

## 2018-10-02 PROCEDURE — 700105 HCHG RX REV CODE 258: Performed by: EMERGENCY MEDICINE

## 2018-10-02 PROCEDURE — 700105 HCHG RX REV CODE 258: Performed by: INTERNAL MEDICINE

## 2018-10-02 PROCEDURE — 99233 SBSQ HOSP IP/OBS HIGH 50: CPT | Performed by: HOSPITALIST

## 2018-10-02 PROCEDURE — 700102 HCHG RX REV CODE 250 W/ 637 OVERRIDE(OP): Performed by: INTERNAL MEDICINE

## 2018-10-02 PROCEDURE — 770006 HCHG ROOM/CARE - MED/SURG/GYN SEMI*

## 2018-10-02 PROCEDURE — A9270 NON-COVERED ITEM OR SERVICE: HCPCS | Performed by: INTERNAL MEDICINE

## 2018-10-02 PROCEDURE — A9270 NON-COVERED ITEM OR SERVICE: HCPCS | Performed by: HOSPITALIST

## 2018-10-02 PROCEDURE — 700102 HCHG RX REV CODE 250 W/ 637 OVERRIDE(OP): Performed by: HOSPITALIST

## 2018-10-02 RX ORDER — AMOXICILLIN AND CLAVULANATE POTASSIUM 875; 125 MG/1; MG/1
1 TABLET, FILM COATED ORAL EVERY 12 HOURS
Status: DISCONTINUED | OUTPATIENT
Start: 2018-10-02 | End: 2018-10-03 | Stop reason: HOSPADM

## 2018-10-02 RX ORDER — BENZONATATE 100 MG/1
100 CAPSULE ORAL 3 TIMES DAILY PRN
Status: DISCONTINUED | OUTPATIENT
Start: 2018-10-02 | End: 2018-10-03 | Stop reason: HOSPADM

## 2018-10-02 RX ADMIN — HYDROCODONE BITARTRATE AND HOMATROPINE METHYLBROMIDE 5 ML: 5; 1.5 SOLUTION ORAL at 00:44

## 2018-10-02 RX ADMIN — AMOXICILLIN AND CLAVULANATE POTASSIUM 1 TABLET: 875; 125 TABLET, FILM COATED ORAL at 20:35

## 2018-10-02 RX ADMIN — SODIUM CHLORIDE: 9 INJECTION, SOLUTION INTRAVENOUS at 09:20

## 2018-10-02 RX ADMIN — HYDROCODONE BITARTRATE AND HOMATROPINE METHYLBROMIDE 5 ML: 5; 1.5 SOLUTION ORAL at 05:25

## 2018-10-02 RX ADMIN — BENZONATATE 100 MG: 100 CAPSULE ORAL at 17:28

## 2018-10-02 RX ADMIN — SODIUM CHLORIDE 3 G: 900 INJECTION INTRAVENOUS at 05:26

## 2018-10-02 RX ADMIN — AMOXICILLIN AND CLAVULANATE POTASSIUM 1 TABLET: 875; 125 TABLET, FILM COATED ORAL at 11:02

## 2018-10-02 RX ADMIN — SODIUM CHLORIDE 1000 ML: 9 INJECTION, SOLUTION INTRAVENOUS at 17:31

## 2018-10-02 RX ADMIN — BENZONATATE 100 MG: 100 CAPSULE ORAL at 11:02

## 2018-10-02 RX ADMIN — DOXYCYCLINE 100 MG: 100 TABLET, FILM COATED ORAL at 05:25

## 2018-10-02 RX ADMIN — SODIUM CHLORIDE 3 G: 900 INJECTION INTRAVENOUS at 00:43

## 2018-10-02 ASSESSMENT — COGNITIVE AND FUNCTIONAL STATUS - GENERAL
SUGGESTED CMS G CODE MODIFIER MOBILITY: CH
MOBILITY SCORE: 24
SUGGESTED CMS G CODE MODIFIER DAILY ACTIVITY: CH
DAILY ACTIVITIY SCORE: 24

## 2018-10-02 ASSESSMENT — PATIENT HEALTH QUESTIONNAIRE - PHQ9
1. LITTLE INTEREST OR PLEASURE IN DOING THINGS: NOT AT ALL
1. LITTLE INTEREST OR PLEASURE IN DOING THINGS: NOT AT ALL
SUM OF ALL RESPONSES TO PHQ9 QUESTIONS 1 AND 2: 0
SUM OF ALL RESPONSES TO PHQ9 QUESTIONS 1 AND 2: 0
2. FEELING DOWN, DEPRESSED, IRRITABLE, OR HOPELESS: NOT AT ALL
2. FEELING DOWN, DEPRESSED, IRRITABLE, OR HOPELESS: NOT AT ALL

## 2018-10-02 ASSESSMENT — ENCOUNTER SYMPTOMS
LOSS OF CONSCIOUSNESS: 0
ABDOMINAL PAIN: 0
FLANK PAIN: 0
CONSTITUTIONAL NEGATIVE: 1
BRUISES/BLEEDS EASILY: 0
PSYCHIATRIC NEGATIVE: 1
NAUSEA: 1
DEPRESSION: 0
FEVER: 0
BACK PAIN: 0
CHILLS: 0
SHORTNESS OF BREATH: 0
WEIGHT LOSS: 0
HEMOPTYSIS: 0
COUGH: 1
NERVOUS/ANXIOUS: 0
WHEEZING: 0
MYALGIAS: 0
CARDIOVASCULAR NEGATIVE: 1
DIZZINESS: 0
MUSCULOSKELETAL NEGATIVE: 1
VOMITING: 1
NEUROLOGICAL NEGATIVE: 1
SPUTUM PRODUCTION: 0
WEAKNESS: 0

## 2018-10-02 ASSESSMENT — PAIN SCALES - GENERAL
PAINLEVEL_OUTOF10: 0

## 2018-10-02 NOTE — PROGRESS NOTES
[facility] Medicine Daily Progress Note    Date of Service  10/2/2018    Chief Complaint  58 y.o. female admitted 9/30/2018 with worsening cough and weakness following influenza diagnosis.    Hospital Course    58 y.o. female recently diagnosed with influenza A -not treated who presented 9/30/2018 with worsening cough, weakness. She first became ill on Monday with feverish feeling, chills, sweats, achy, mild headache, sore throat, rhinitis and congestion.  She had persistent hacking cough and was seen as an outpatient Thursday, rapid flu was negative and she was given Robitussin-AC and Motrin, subsequent PCR has come back positive.  Rather than improving however patient has continued to worsen, he is unable to keep down food due to the persistent coughing she vomits-she has become weak and dizzy, is unable to be up and about for more than a couple of minutes before she is overwhelmingly fatigued and nauseated.  She contacted her sister who is a physician and was brought here.      Interval Problem Update  She continues to have a dry and hacking cough.  She has had nausea and several episodes of vomiting overnight.  Oral intake is quite poor.  Nausea is relieved by Zofran but thinks it is precipitated by the Mucinex.    Consultants/Specialty  None    Code Status  Full code    Disposition  Home when medically cleared.    Review of Systems  Review of Systems   Constitutional: Negative.  Negative for chills, fever, malaise/fatigue and weight loss.   HENT: Negative.    Respiratory: Positive for cough. Negative for hemoptysis, sputum production, shortness of breath and wheezing.    Cardiovascular: Negative.  Negative for chest pain and leg swelling.   Gastrointestinal: Positive for nausea and vomiting. Negative for abdominal pain.   Genitourinary: Negative.  Negative for dysuria and flank pain.   Musculoskeletal: Negative.  Negative for back pain and myalgias.   Neurological: Negative.  Negative for dizziness, loss of  consciousness and weakness.   Endo/Heme/Allergies: Negative.  Does not bruise/bleed easily.   Psychiatric/Behavioral: Negative.  Negative for depression. The patient is not nervous/anxious.    All other systems reviewed and are negative.       Physical Exam  Temp:  [36.7 °C (98.1 °F)-37.3 °C (99.1 °F)] 37.2 °C (98.9 °F)  Pulse:  [82-89] 82  Resp:  [18] 18  BP: (100-129)/(66-79) 129/79    Physical Exam   Constitutional: She appears well-developed and well-nourished. She appears distressed (Actively coughing).   Patient seen and examined by me.   HENT:   Nose: Nose normal.   Mouth/Throat: Oropharynx is clear and moist. No oropharyngeal exudate.   Eyes: Conjunctivae are normal. Right eye exhibits no discharge. Left eye exhibits no discharge. No scleral icterus.   Neck: No JVD present. No tracheal deviation present.   Cardiovascular: Normal rate, regular rhythm and normal heart sounds.    Pulmonary/Chest: Effort normal. No stridor. No respiratory distress. She has no wheezes. She has rales (Left lower lobe and right middle lobe). She exhibits no tenderness.   Abdominal: Soft. Bowel sounds are normal. She exhibits no distension. There is no tenderness.   Musculoskeletal: She exhibits no edema or tenderness.   Neurological: She is alert. No cranial nerve deficit. She exhibits normal muscle tone.   Skin: Skin is warm and dry. She is not diaphoretic. No pallor.   Psychiatric: She has a normal mood and affect. Her behavior is normal.   Nursing note and vitals reviewed.      Fluids    Intake/Output Summary (Last 24 hours) at 10/02/18 1629  Last data filed at 10/02/18 0600   Gross per 24 hour   Intake             2296 ml   Output                0 ml   Net             2296 ml       Laboratory  Recent Labs      09/30/18   1310  10/01/18   0527   WBC  5.5  4.3*   RBC  4.67  3.84*   HEMOGLOBIN  13.5  11.1*   HEMATOCRIT  39.0  33.2*   MCV  83.5  86.5   MCH  28.9  28.9   MCHC  34.6  33.4*   RDW  38.0  40.3   PLATELETCT  261  217    MPV  10.0  10.2     Recent Labs      09/30/18   1310  10/01/18   0527   SODIUM  134*  134*   POTASSIUM  3.4*  4.1   CHLORIDE  103  110   CO2  22  23   GLUCOSE  96  96   BUN  14  7*   CREATININE  0.76  0.68   CALCIUM  8.6  8.2*         Recent Labs      09/30/18   1310   BNPBTYPENAT  23           Imaging  CT-CTA CHEST PULMONARY ARTERY W/ RECONS   Final Result      1.  No evidence of pulmonary embolism.      2.  Patchy areas of alveolar opacity in the left lower lobe, lingula, and right upper lobe consistent with pneumonia.               DX-CHEST-2 VIEWS   Final Result      Negative two views of the chest.           Assessment/Plan  Nausea and vomiting   Assessment & Plan    Suspect secondary to side effects from either the antibiotics or narcotic-containing cough medicine or guaifenesin or all.  Zofran as needed for nausea.  Change antibiotics as detailed.  Change to Tessalon Perles for coughing.        Multifocal pneumonia   Assessment & Plan      Post influenza bacterial superinfection  Ct chest: 2.  Patchy areas of alveolar opacity in the left lower lobe, lingula, and right upper lobe consistent with pneumonia.  Tolerating room  Droplet Precautions while in the hospital  Continue abx de-escalate to oral Augmentin, doxycycline may be making her nauseated peer  Cultures negative to date        Influenza A   Assessment & Plan    Well past 48-hour window for Tamiflu treatment at the time of admission.  Continue isolation while in the hospital  Supportive treatment            Acute respiratory failure with hypoxemia (HCC)   Assessment & Plan    Secondary to post influenza pneumonia  Tolerating room air but still having a lot of coughing.  Mucinex is making her nauseated, changed to Tessalon Perles.  Respiratory therapy protocol               VTE prophylaxis: Ambulation and SCDs

## 2018-10-02 NOTE — PROGRESS NOTES
Received bedside report from MERRICK Jewell. Plan of care discussed. Safety precautions in place. Call light and personal belongings within reach. Patient has no needs at this time.

## 2018-10-02 NOTE — PROGRESS NOTES
Pt given prn cough medicine as needed. IV fluids infusing. Educated on plan of care. Safety precautions maintained. Weaned to RA. Tolerating well.

## 2018-10-02 NOTE — PROGRESS NOTES
Report received from day shift RN. Pt resting in bed at this time. Call light in reach. Safety precautions maintained. Will continue to monitor.

## 2018-10-02 NOTE — CARE PLAN
Problem: Safety  Goal: Will remain free from injury  Outcome: PROGRESSING AS EXPECTED  Remind patient to use call light and provide assistance. Bed in low position, bed locked, and appropriate alarms set. Patient wearing non-slip socks. Call light and personal belongings are within reach.       Problem: Knowledge Deficit  Goal: Knowledge of the prescribed therapeutic regimen will improve  Outcome: PROGRESSING AS EXPECTED  Encourage patient to ask questions and be involved in plan of care. Provide education on treatment plan, diagnostic testing, and medications; have patient verbalize understanding.

## 2018-10-02 NOTE — PROGRESS NOTES
Resting in bed at this time. +nonproductive dry cough remains. Tessalon perrls with good effect. PO antibiotics, IV fluids continue. Denies pain. Generalized weakness. Will continue to monitor.

## 2018-10-02 NOTE — CARE PLAN
Problem: Communication  Goal: The ability to communicate needs accurately and effectively will improve  Outcome: PROGRESSING AS EXPECTED  Communicates needs appropriately.     Problem: Safety  Goal: Will remain free from injury  Outcome: PROGRESSING AS EXPECTED  Independent in room, steady gait. Good safety awareness.     Problem: Infection  Goal: Will remain free from infection  Outcome: PROGRESSING AS EXPECTED  Droplet precautions in place for + flu A. Continues on IV and PO antibiotics for PNA.

## 2018-10-02 NOTE — ASSESSMENT & PLAN NOTE
Suspect secondary to side effects from either the antibiotics or narcotic-containing cough medicine or guaifenesin or all.  Zofran as needed for nausea.  Change antibiotics as detailed.  Change to Tessalon Perles for coughing.

## 2018-10-03 VITALS
SYSTOLIC BLOOD PRESSURE: 124 MMHG | BODY MASS INDEX: 23.32 KG/M2 | WEIGHT: 148.59 LBS | DIASTOLIC BLOOD PRESSURE: 74 MMHG | HEART RATE: 80 BPM | TEMPERATURE: 98.5 F | HEIGHT: 67 IN | OXYGEN SATURATION: 93 % | RESPIRATION RATE: 18 BRPM

## 2018-10-03 PROCEDURE — 99239 HOSP IP/OBS DSCHRG MGMT >30: CPT | Performed by: HOSPITALIST

## 2018-10-03 PROCEDURE — 700102 HCHG RX REV CODE 250 W/ 637 OVERRIDE(OP): Performed by: HOSPITALIST

## 2018-10-03 PROCEDURE — A9270 NON-COVERED ITEM OR SERVICE: HCPCS | Performed by: HOSPITALIST

## 2018-10-03 RX ORDER — AMOXICILLIN AND CLAVULANATE POTASSIUM 875; 125 MG/1; MG/1
1 TABLET, FILM COATED ORAL EVERY 12 HOURS
Qty: 10 TAB | Refills: 0 | Status: SHIPPED | OUTPATIENT
Start: 2018-10-03 | End: 2018-10-08

## 2018-10-03 RX ORDER — BENZONATATE 100 MG/1
100 CAPSULE ORAL 3 TIMES DAILY PRN
Qty: 60 CAP | Refills: 0 | Status: SHIPPED | OUTPATIENT
Start: 2018-10-03 | End: 2021-11-05

## 2018-10-03 RX ADMIN — AMOXICILLIN AND CLAVULANATE POTASSIUM 1 TABLET: 875; 125 TABLET, FILM COATED ORAL at 05:41

## 2018-10-03 RX ADMIN — BENZONATATE 100 MG: 100 CAPSULE ORAL at 01:59

## 2018-10-03 RX ADMIN — BENZONATATE 100 MG: 100 CAPSULE ORAL at 11:10

## 2018-10-03 ASSESSMENT — PATIENT HEALTH QUESTIONNAIRE - PHQ9
SUM OF ALL RESPONSES TO PHQ9 QUESTIONS 1 AND 2: 0
2. FEELING DOWN, DEPRESSED, IRRITABLE, OR HOPELESS: NOT AT ALL
1. LITTLE INTEREST OR PLEASURE IN DOING THINGS: NOT AT ALL

## 2018-10-03 ASSESSMENT — PAIN SCALES - GENERAL
PAINLEVEL_OUTOF10: 0

## 2018-10-03 NOTE — PROGRESS NOTES
Report given to Fanta SIN. POC discussed. Pt resting comfortably in bed. Safety precautions in place.

## 2018-10-03 NOTE — PROGRESS NOTES
Assessment complete. Due med given. Pt reports no pain or discomfort. Pt has dry non-productive cough. Pt declines cough medication at this time. Safety precautions in place. Call light in reach.

## 2018-10-03 NOTE — PROGRESS NOTES
Care assumed from day shift RN. POC and orders reviewed. Pt resting in bed at this time. Call light in reach. Safety precautions maintained. Will continue to monitor.

## 2018-10-03 NOTE — RESPIRATORY CARE
COPD EDUCATION by COPD CLINICAL EDUCATOR  10/3/2018 at 8:09 AM by Rosey Callejas     Patient reviewed by COPD education team. Patient does not qualify for COPD program.

## 2018-10-03 NOTE — DISCHARGE SUMMARY
Discharge Summary    CHIEF COMPLAINT ON ADMISSION  Chief Complaint   Patient presents with   • Cough       Reason for Admission  cough, N/V     Admission Date  9/30/2018    CODE STATUS  Full Code    HPI & HOSPITAL COURSE  This is a 58 y.o. female here with post influenza multi focal pneumonia.       58 y.o. female recently diagnosed with influenza A -not treated who presented 9/30/2018 with worsening cough, weakness. She first became ill on Monday with feverish feeling, chills, sweats, achy, mild headache, sore throat, rhinitis and congestion.  She had persistent hacking cough and was seen as an outpatient Thursday, rapid flu was negative and she was given Robitussin-AC and Motrin, subsequent PCR has come back positive.  Rather than improving however patient has continued to worsen, he is unable to keep down food due to the persistent coughing she vomits-she has become weak and dizzy, is unable to be up and about for more than a couple of minutes before she is overwhelmingly fatigued and nauseated.  She contacted her sister who is a physician and was brought here.      She was treated with IV unasyn and azithromycin, weaned off oxygen and shortness of breath improved. No fevers or chills. She has a persistent dry hacking cough consistent with a post viral cough. Patient is in good condition and wishes to be discharged home.    Therefore, she is discharged in good and stable condition to home with close outpatient follow-up.    The patient met 2-midnight criteria for an inpatient stay at the time of discharge.    Discharge Date  10/3/18    FOLLOW UP ITEMS POST DISCHARGE  Primary care.    DISCHARGE DIAGNOSES  Active Problems:    Acute respiratory failure with hypoxemia (HCC) POA: Unknown    Influenza A POA: Unknown    Multifocal pneumonia POA: Unknown    Nausea and vomiting POA: Unknown  Resolved Problems:    * No resolved hospital problems. *      FOLLOW UP  No future appointments.  No follow-up provider  specified.    MEDICATIONS ON DISCHARGE     Medication List      START taking these medications      Instructions   amoxicillin-clavulanate 875-125 MG Tabs  Commonly known as:  AUGMENTIN   Take 1 Tab by mouth every 12 hours for 5 days.  Dose:  1 Tab     benzonatate 100 MG Caps  Commonly known as:  TESSALON   Take 1 Cap by mouth 3 times a day as needed for Cough.  Dose:  100 mg     ipratropium 17 MCG/ACT Aers  Commonly known as:  ATROVENT   Inhale 2 Puffs by mouth every 6 hours.  Dose:  2 Puff        CONTINUE taking these medications      Instructions   ibuprofen 200 MG Tabs  Commonly known as:  MOTRIN   Take 400 mg by mouth every 6 hours as needed (Pain).  Dose:  400 mg        STOP taking these medications    guaifenesin-codeine Soln oral solution  Commonly known as:  ROBITUSSIN AC            Allergies  No Known Allergies    DIET  Orders Placed This Encounter   Procedures   • Diet Order Regular     Standing Status:   Standing     Number of Occurrences:   1     Order Specific Question:   Diet:     Answer:   Regular [1]       ACTIVITY  As tolerated.  Weight bearing as tolerated    CONSULTATIONS  None.    PROCEDURES  None.    LABORATORY  Lab Results   Component Value Date    SODIUM 134 (L) 10/01/2018    POTASSIUM 4.1 10/01/2018    CHLORIDE 110 10/01/2018    CO2 23 10/01/2018    GLUCOSE 96 10/01/2018    BUN 7 (L) 10/01/2018    CREATININE 0.68 10/01/2018        Lab Results   Component Value Date    WBC 4.3 (L) 10/01/2018    HEMOGLOBIN 11.1 (L) 10/01/2018    HEMATOCRIT 33.2 (L) 10/01/2018    PLATELETCT 217 10/01/2018        Total time of the discharge process exceeds 32 minutes.

## 2018-10-03 NOTE — PROGRESS NOTES
Hourly rounding complete. Pt medicated for cough per MAR. No other needs at this time. Safety precautions in place. Call light in reach.

## 2018-10-03 NOTE — PROGRESS NOTES
Pt discharged home, accompanied by family. Discharge instructions given and education provided. Pt verbalized understanding of teaching. Belongings sent with patient.

## 2018-10-03 NOTE — DISCHARGE INSTRUCTIONS
Discharge Instructions    Discharged to home by car with self. Discharged via walking, hospital escort: Refused.  Special equipment needed: Not Applicable    Be sure to schedule a follow-up appointment with your primary care doctor or any specialists as instructed.     Discharge Plan:   Influenza Vaccine Indication: Patient Refuses    I understand that a diet low in cholesterol, fat, and sodium is recommended for good health. Unless I have been given specific instructions below for another diet, I accept this instruction as my diet prescription.   Other diet: regular as tolerated.     Special Instructions: None      Influenza, Adult  Influenza, more commonly known as “the flu,” is a viral infection that primarily affects the respiratory tract. The respiratory tract includes organs that help you breathe, such as the lungs, nose, and throat. The flu causes many common cold symptoms, as well as a high fever and body aches.  The flu spreads easily from person to person (is contagious). Getting a flu shot (influenza vaccination) every year is the best way to prevent influenza.  What are the causes?  Influenza is caused by a virus. You can catch the virus by:  · Breathing in droplets from an infected person's cough or sneeze.  · Touching something that was recently contaminated with the virus and then touching your mouth, nose, or eyes.  What increases the risk?  The following factors may make you more likely to get the flu:  · Not cleaning your hands frequently with soap and water or alcohol-based hand .  · Having close contact with many people during cold and flu season.  · Touching your mouth, eyes, or nose without washing or sanitizing your hands first.  · Not drinking enough fluids or not eating a healthy diet.  · Not getting enough sleep or exercise.  · Being under a high amount of stress.  · Not getting a yearly (annual) flu shot.  You may be at a higher risk of complications from the flu, such as a severe  lung infection (pneumonia), if you:  · Are over the age of 65.  · Are pregnant.  · Have a weakened disease-fighting system (immune system). You may have a weakened immune system if you:  ¨ Have HIV or AIDS.  ¨ Are undergoing chemotherapy.  ¨ Are taking medicines that reduce the activity of (suppress) the immune system.  · Have a long-term (chronic) illness, such as heart disease, kidney disease, diabetes, or lung disease.  · Have a liver disorder.  · Are obese.  · Have anemia.  What are the signs or symptoms?  Symptoms of this condition typically last 4-10 days and may include:  · Fever.  · Chills.  · Headache, body aches, or muscle aches.  · Sore throat.  · Cough.  · Runny or congested nose.  · Chest discomfort and cough.  · Poor appetite.  · Weakness or tiredness (fatigue).  · Dizziness.  · Nausea or vomiting.  How is this diagnosed?  This condition may be diagnosed based on your medical history and a physical exam. Your health care provider may do a nose or throat swab test to confirm the diagnosis.  How is this treated?  If influenza is detected early, you can be treated with antiviral medicine that can reduce the length of your illness and the severity of your symptoms. This medicine may be given by mouth (orally) or through an IV tube that is inserted in one of your veins.  The goal of treatment is to relieve symptoms by taking care of yourself at home. This may include taking over-the-counter medicines, drinking plenty of fluids, and adding humidity to the air in your home.  In some cases, influenza goes away on its own. Severe influenza or complications from influenza may be treated in a hospital.  Follow these instructions at home:  · Take over-the-counter and prescription medicines only as told by your health care provider.  · Use a cool mist humidifier to add humidity to the air in your home. This can make breathing easier.  · Rest as needed.  · Drink enough fluid to keep your urine clear or pale  yellow.  · Cover your mouth and nose when you cough or sneeze.  · Wash your hands with soap and water often, especially after you cough or sneeze. If soap and water are not available, use hand .  · Stay home from work or school as told by your health care provider. Unless you are visiting your health care provider, try to avoid leaving home until your fever has been gone for 24 hours without the use of medicine.  · Keep all follow-up visits as told by your health care provider. This is important.  How is this prevented?  · Getting an annual flu shot is the best way to avoid getting the flu. You may get the flu shot in late summer, fall, or winter. Ask your health care provider when you should get your flu shot.  · Wash your hands often or use hand  often.  · Avoid contact with people who are sick during cold and flu season.  · Eat a healthy diet, drink plenty of fluids, get enough sleep, and exercise regularly.  Contact a health care provider if:  · You develop new symptoms.  · You have:  ¨ Chest pain.  ¨ Diarrhea.  ¨ A fever.  · Your cough gets worse.  · You produce more mucus.  · You feel nauseous or you vomit.  Get help right away if:  · You develop shortness of breath or difficulty breathing.  · Your skin or nails turn a bluish color.  · You have severe pain or stiffness in your neck.  · You develop a sudden headache or sudden pain in your face or ear.  · You cannot stop vomiting.  This information is not intended to replace advice given to you by your health care provider. Make sure you discuss any questions you have with your health care provider.  Document Released: 12/15/2001 Document Revised: 05/25/2017 Document Reviewed: 10/11/2016  [employer] Patient Education © 2017 [employer]    Pneumonitis  Pneumonitis is inflammation of the lungs.   CAUSES   Many things can cause pneumonitis. These can include:   · A bacterial or viral infection. Pneumonitis due to an infection is  usually called pneumonia.  · Work-related exposures, including farm and industrial work. Some substances that can cause pneumonitis include asbestos, silica, inhaled acids, or inhaled chlorine gas.    · Repeated exposure to bird feathers, bird feces, or other allergens.    · Medicine such as chemotherapy drugs, certain antibiotics, and some heart medicines.    · Radiation therapy.    · Exposure to mold. A hot tub, sauna, or home humidifier can have mold growing in it, even if it looks clean. The mold can be breathed in through water vapor.  · Breathing (aspirating) stomach contents, food, or liquids into the lungs.    SIGNS AND SYMPTOMS   · Cough.    · Shortness of breath or difficulty breathing.    · Fever.    · Decreased energy.    · Decreased appetite.    DIAGNOSIS   To diagnose pneumonitis, your health care provider will do a complete history and physical exam. Various tests may be ordered, such as:   · Pulmonary function test.    · Chest X-ray.    · CT scan of the lungs.    · Bronchoscopy.    · Lung biopsy.    TREATMENT   Treatment will depend on the cause of the pneumonitis. If the cause is exposure to a substance, avoiding further exposure to that substance will help reduce your symptoms. Possible medical treatments for pneumonitis include:   · Corticosteroid medicine to help decrease inflammation in the lungs.    · Antibiotic medicine to help fight a bacterial lung infection.    · Oxygen therapy if you are having difficulty breathing.    HOME CARE INSTRUCTIONS   · Avoid exposure to any substance identified as the cause of your pneumonitis.    · If you must continue to work with substances that can cause pneumonitis, wear a mask to protect your lungs.    · Only take over-the-counter or prescription medicine as directed by your health care provider.    · Do not smoke.    · If you use inhalers, keep them with you at all times.    · Follow up with your health care provider as directed.    SEEK IMMEDIATE MEDICAL  CARE IF:   · You develop new or increased shortness of breath.    · You develop a blue color (cyanosis) under your fingernails.    · You have a fever.    MAKE SURE YOU:   · Understand these instructions.  · Will watch your condition.  · Will get help right away if you are not doing well or get worse.  This information is not intended to replace advice given to you by your health care provider. Make sure you discuss any questions you have with your health care provider.  Document Released: 06/07/2011 Document Revised: 08/20/2014 Document Reviewed: 06/09/2014  [employer] Interactive Patient Education © 2017 [employer]      · Is patient discharged on Warfarin / Coumadin?   No     Depression / Suicide Risk    As you are discharged from this [facility] facility, it is important to learn how to keep safe from harming yourself.    Recognize the warning signs:  · Abrupt changes in personality, positive or negative- including increase in energy   · Giving away possessions  · Change in eating patterns- significant weight changes-  positive or negative  · Change in sleeping patterns- unable to sleep or sleeping all the time   · Unwillingness or inability to communicate  · Depression  · Unusual sadness, discouragement and loneliness  · Talk of wanting to die  · Neglect of personal appearance   · Rebelliousness- reckless behavior  · Withdrawal from people/activities they love  · Confusion- inability to concentrate     If you or a loved one observes any of these behaviors or has concerns about self-harm, here's what you can do:  · Talk about it- your feelings and reasons for harming yourself  · Remove any means that you might use to hurt yourself (examples: pills, rope, extension cords, firearm)  · Get professional help from the community (Mental Health, Substance Abuse, psychological counseling)  · Do not be alone:Call your Safe Contact- someone whom you trust who will be there for you.  · Call your local CRISIS HOTLINE  655-8673 or 914-621-3901  · Call your local Children's Mobile Crisis Response Team [place] (701) 289-9413 or www.Create.myPizza.com  · Call the toll free National Suicide Prevention Hotlines   · National Suicide Prevention Lifeline 613-956-RYRF (9829)  · National Dedi[employer] Line Network 800-SUICIDE (298-6504)

## 2018-10-03 NOTE — PROGRESS NOTES
Report received from Fanta SIN. POC discussed. Pt resting comfortably in bed. Safety precautions in place.

## 2018-10-05 ENCOUNTER — PATIENT OUTREACH (OUTPATIENT)
Dept: HEALTH INFORMATION MANAGEMENT | Facility: OTHER | Age: 58
End: 2018-10-05

## 2018-10-05 LAB
BACTERIA BLD CULT: NORMAL
BACTERIA BLD CULT: NORMAL
SIGNIFICANT IND 70042: NORMAL
SIGNIFICANT IND 70042: NORMAL
SITE SITE: NORMAL
SITE SITE: NORMAL
SOURCE SOURCE: NORMAL
SOURCE SOURCE: NORMAL

## 2018-10-05 NOTE — PROGRESS NOTES
10/5/18 9:00am:  CM post discharge outreach call first attempt.  VM left requesting return call to .    10/5/18 9:10am: Post discharge call completed.

## 2018-11-07 ENCOUNTER — APPOINTMENT (OUTPATIENT)
Dept: MEDICAL GROUP | Facility: MEDICAL CENTER | Age: 58
End: 2018-11-07
Payer: COMMERCIAL

## 2019-04-01 NOTE — CARE PLAN
Problem: Knowledge Deficit  Goal: Knowledge of disease process/condition, treatment plan, diagnostic tests, and medications will improve  Outcome: PROGRESSING AS EXPECTED  Pt knowledge level assessed. Pt educated on disease process/condition, POC, diagnostic tests, and medications. Pt verbalized understanding of care plan.       Problem: Respiratory:  Goal: Respiratory status will improve  Outcome: PROGRESSING AS EXPECTED  Pt educated on IS and usage encouraged. Pt demonstrates usage. Frequent ambulation encouraged. Cough and deep breathing technique used. Pt demonstrates understanding of respiratory care plan.        Quality 110: Preventive Care And Screening: Influenza Immunization: Influenza Immunization Administered during Influenza season Quality 131: Pain Assessment And Follow-Up: Pain assessment documented as positive using a standardized tool AND a follow-up plan is documented Quality 226: Preventive Care And Screening: Tobacco Use: Screening And Cessation Intervention: Patient screened for tobacco use and is an ex/non-smoker Quality 400a: One-Time Screening For Hepatitis C Virus (Hcv) For All Patients: Patient received one-time screening for HCV infection Quality 130: Documentation Of Current Medications In The Medical Record: Current Medications Documented Quality 431: Preventive Care And Screening: Unhealthy Alcohol Use - Screening: Patient screened for unhealthy alcohol use using a single question and scores less than 2 times per year Quality 111:Pneumonia Vaccination Status For Older Adults: Pneumococcal Vaccination Previously Received Detail Level: Detailed

## 2020-07-06 ENCOUNTER — NON-PROVIDER VISIT (OUTPATIENT)
Dept: OCCUPATIONAL MEDICINE | Facility: CLINIC | Age: 60
End: 2020-07-06

## 2020-07-06 DIAGNOSIS — Z02.89 ENCOUNTER FOR OCCUPATIONAL HEALTH EXAMINATION: Primary | ICD-10-CM

## 2020-07-06 PROCEDURE — 86580 TB INTRADERMAL TEST: CPT | Performed by: NURSE PRACTITIONER

## 2020-07-09 ENCOUNTER — NON-PROVIDER VISIT (OUTPATIENT)
Dept: OCCUPATIONAL MEDICINE | Facility: CLINIC | Age: 60
End: 2020-07-09

## 2020-07-09 LAB — TB WHEAL 3D P 5 TU DIAM: 0 MM

## 2020-12-09 ENCOUNTER — OFFICE VISIT (OUTPATIENT)
Dept: URGENT CARE | Facility: PHYSICIAN GROUP | Age: 60
End: 2020-12-09
Payer: COMMERCIAL

## 2020-12-09 ENCOUNTER — HOSPITAL ENCOUNTER (OUTPATIENT)
Facility: MEDICAL CENTER | Age: 60
End: 2020-12-09
Attending: PHYSICIAN ASSISTANT
Payer: COMMERCIAL

## 2020-12-09 VITALS
OXYGEN SATURATION: 96 % | RESPIRATION RATE: 16 BRPM | DIASTOLIC BLOOD PRESSURE: 82 MMHG | HEIGHT: 65 IN | HEART RATE: 82 BPM | WEIGHT: 145 LBS | TEMPERATURE: 98.5 F | BODY MASS INDEX: 24.16 KG/M2 | SYSTOLIC BLOOD PRESSURE: 118 MMHG

## 2020-12-09 DIAGNOSIS — Z20.822 SUSPECTED COVID-19 VIRUS INFECTION: ICD-10-CM

## 2020-12-09 DIAGNOSIS — Z20.822 EXPOSURE TO COVID-19 VIRUS: ICD-10-CM

## 2020-12-09 DIAGNOSIS — R05.9 COUGH: ICD-10-CM

## 2020-12-09 DIAGNOSIS — B97.89 VIRAL RESPIRATORY ILLNESS: ICD-10-CM

## 2020-12-09 DIAGNOSIS — J98.8 VIRAL RESPIRATORY ILLNESS: ICD-10-CM

## 2020-12-09 LAB — COVID ORDER STATUS COVID19: NORMAL

## 2020-12-09 PROCEDURE — U0003 INFECTIOUS AGENT DETECTION BY NUCLEIC ACID (DNA OR RNA); SEVERE ACUTE RESPIRATORY SYNDROME CORONAVIRUS 2 (SARS-COV-2) (CORONAVIRUS DISEASE [COVID-19]), AMPLIFIED PROBE TECHNIQUE, MAKING USE OF HIGH THROUGHPUT TECHNOLOGIES AS DESCRIBED BY CMS-2020-01-R: HCPCS

## 2020-12-09 PROCEDURE — 99214 OFFICE O/P EST MOD 30 MIN: CPT | Mod: CS | Performed by: PHYSICIAN ASSISTANT

## 2020-12-09 RX ORDER — BENZONATATE 100 MG/1
200 CAPSULE ORAL 3 TIMES DAILY PRN
Qty: 60 CAP | Refills: 0 | Status: SHIPPED | OUTPATIENT
Start: 2020-12-09 | End: 2021-11-05

## 2020-12-09 ASSESSMENT — ENCOUNTER SYMPTOMS
HEADACHES: 1
DIARRHEA: 1
CHILLS: 1
MYALGIAS: 1
NAUSEA: 1
SORE THROAT: 1
FEVER: 1
COUGH: 1

## 2020-12-09 NOTE — PROGRESS NOTES
"Subjective:     Nallely Herrera  is a 60 y.o. female who presents for Headache (Bodyaches, diarrhea, small cough x 4 days.  Exposed to COVID.)    This is a new problem.  Patient presents to urgent care with 4 to 5-day history of low-grade fever, chills, generalized body aches, headache, slight sore throat, slight nonproductive cough, nausea and loose stools.  Patient has had a direct exposure to COVID-19.     Headache   Associated symptoms include coughing, a fever, nausea and a sore throat.         Review of Systems   Constitutional: Positive for chills, fever and malaise/fatigue.   HENT: Positive for congestion and sore throat.    Respiratory: Positive for cough.    Gastrointestinal: Positive for diarrhea and nausea.   Musculoskeletal: Positive for myalgias.   Neurological: Positive for headaches.   All other systems reviewed and are negative.    No Known Allergies  Past medical history, family history, surgical history and social history are reviewed and updated in the record today.     Objective:   BP (!) 98/60   Pulse 82   Temp 36.9 °C (98.5 °F) (Temporal)   Resp 16   Ht 1.651 m (5' 5\")   Wt 65.8 kg (145 lb)   SpO2 96%   BMI 24.13 kg/m²   Physical Exam  Vitals signs reviewed.   Constitutional:       General: She is not in acute distress.     Appearance: She is well-developed. She is not ill-appearing or toxic-appearing.   HENT:      Head: Normocephalic and atraumatic.      Right Ear: Tympanic membrane, ear canal and external ear normal.      Left Ear: Tympanic membrane, ear canal and external ear normal.      Nose: Nose normal.      Mouth/Throat:      Lips: Pink. No lesions.      Mouth: Mucous membranes are moist.      Pharynx: Oropharynx is clear. Uvula midline. No oropharyngeal exudate.   Eyes:      General: Lids are normal.      Extraocular Movements: Extraocular movements intact.      Conjunctiva/sclera: Conjunctivae normal.      Pupils: Pupils are equal, round, and reactive to light.   Neck:      " Musculoskeletal: Normal range of motion and neck supple.   Cardiovascular:      Rate and Rhythm: Normal rate and regular rhythm.      Heart sounds: Normal heart sounds. No murmur. No friction rub. No gallop.    Pulmonary:      Effort: Pulmonary effort is normal. No respiratory distress.      Breath sounds: Normal breath sounds.   Abdominal:      General: Bowel sounds are normal. There is no distension.      Palpations: Abdomen is soft. There is no mass.      Tenderness: There is no abdominal tenderness. There is no guarding or rebound.   Musculoskeletal: Normal range of motion.         General: No tenderness or deformity.   Lymphadenopathy:      Head:      Right side of head: No submental, submandibular or tonsillar adenopathy.      Left side of head: No submental, submandibular or tonsillar adenopathy.      Cervical: No cervical adenopathy.      Upper Body:      Right upper body: No supraclavicular adenopathy.      Left upper body: No supraclavicular adenopathy.   Skin:     General: Skin is warm and dry.      Findings: No rash.   Neurological:      Mental Status: She is alert and oriented to person, place, and time.      Cranial Nerves: Cranial nerves are intact. No cranial nerve deficit.      Sensory: Sensation is intact. No sensory deficit.      Motor: Motor function is intact.      Coordination: Coordination is intact. Coordination normal.      Gait: Gait is intact.   Psychiatric:         Attention and Perception: Attention normal.         Mood and Affect: Mood and affect normal.         Speech: Speech normal.         Behavior: Behavior normal. Behavior is cooperative.         Thought Content: Thought content normal.         Judgment: Judgment normal.          Assessment/Plan:   1. Viral respiratory illness  - COVID/SARS COV-2 PCR; Future    2. Suspected COVID-19 virus infection  - COVID/SARS COV-2 PCR; Future    3. Exposure to COVID-19 virus  - COVID/SARS COV-2 PCR; Future    4. Cough  - benzonatate (TESSALON)  100 MG Cap; Take 2 Caps by mouth 3 times a day as needed.  Dispense: 60 Cap; Refill: 0  - COVID/SARS COV-2 PCR; Future    Testing performed for COVID-19.  Patient/guardian is given printed /MyChart instructions regarding self-isolation.  Work/school note is provided with specific return to work/school protocols.  Reviewed with patient/guardian that if they do test positive they will be contacted by their local health department regarding return to work/school protocols.  Results will also be released to patient/guardian in MyChart or called to the patient/guardian directly.  Encouraged mask use, frequent handwashing, wiping down hard surfaces, etc.    Nasal saline rinse  Over-the-counter Flonase nasal spray per 's instructions  Mucinex as needed  Patient is given Tessalon Perles as needed for cough.  May use Tylenol or ibuprofen over-the-counter as needed for pain or fever not to exceed recommended daily dose.    Upon entering exam room I ensured patient was wearing a mask.  This provider wore appropriate PPE throughout entire visit.  Patient wore mask entire visit except for a brief period while examining oropharynx.    Differential diagnosis, natural history, supportive care, and indications for immediate follow-up discussed.  Red flag warning symptoms and strict ER/follow-up precautions given.  The patient demonstrated a good understanding and agreed with the treatment plan.  Please note that this note was created using voice recognition speech to text software. Every effort has been made to correct obvious errors.  However, I expect there are errors of grammar and possibly context that were not discovered prior to finalizing the note  NEHA Scott PA-C

## 2020-12-09 NOTE — LETTER
December 9, 2020         Patient: Nallely Herrera   YOB: 1960   Date of Visit: 12/9/2020      Your employee was seen in our clinic today.  A concern for COVID-19 has been identified and testing is in progress. We are asking you to excuse absences while following self-isolation protocol per Center for Disease Control (CDC) guidelines.  Your employee will be able to access test results through our electronic delivery system called [employer].     If the results of testing are positive, your employee should follow the CDC guidelines.  These are isolation for a minimum of 10 days and at least 24 hours have passed since your last fever without the use of fever-reducing medications and all other symptoms have improved.  The health department may contact you and provide further directions regarding self-isolation and return to work.     Negative result without close contact*:  If your employee was tested due to their symptoms without close contact to someone with COVID-19 and their test is negative: your employee should not return to work or regular activities until 24 hours after symptoms fully improve. (For example, if patient feels back to normal on Tuesday, should remain isolated through Wednesday).      Negative with close contact*:  If your employee was tested due to close contact to someone, they should self isolate for 14 days after their exposure.    Negative with positive household member  If your employee was due to a positive household member they should still quarantine for a period of 14 days.  The 14 day period begins once the household member is isolated. If unable to quarantine (for example mom from infant), the CDC advises an additional 14-day quarantine period from the COVID-19 household member.   In general, repeat testing is not necessary and not offered through our [facility].     This is the only note that will be provided from [facility] for this visit.  Your employee will  require an appointment with a primary care provider if FMLA or disability forms are required.  If you have any questions please do not hesitate to call me at the phone number listed below.    Sincerely,    CIRILO Fry.-C.  [phone]

## 2020-12-10 LAB
SARS-COV-2 RNA RESP QL NAA+PROBE: DETECTED
SPECIMEN SOURCE: ABNORMAL

## 2021-03-15 DIAGNOSIS — Z23 NEED FOR VACCINATION: ICD-10-CM

## 2021-09-22 ENCOUNTER — OFFICE VISIT (OUTPATIENT)
Dept: URGENT CARE | Facility: PHYSICIAN GROUP | Age: 61
End: 2021-09-22
Payer: COMMERCIAL

## 2021-09-22 ENCOUNTER — HOSPITAL ENCOUNTER (OUTPATIENT)
Facility: MEDICAL CENTER | Age: 61
End: 2021-09-22
Attending: NURSE PRACTITIONER
Payer: COMMERCIAL

## 2021-09-22 VITALS
DIASTOLIC BLOOD PRESSURE: 62 MMHG | WEIGHT: 145 LBS | SYSTOLIC BLOOD PRESSURE: 110 MMHG | HEART RATE: 84 BPM | HEIGHT: 65 IN | TEMPERATURE: 98.6 F | RESPIRATION RATE: 18 BRPM | OXYGEN SATURATION: 97 % | BODY MASS INDEX: 24.16 KG/M2

## 2021-09-22 DIAGNOSIS — J06.9 VIRAL URI WITH COUGH: ICD-10-CM

## 2021-09-22 PROCEDURE — U0005 INFEC AGEN DETEC AMPLI PROBE: HCPCS

## 2021-09-22 PROCEDURE — 99213 OFFICE O/P EST LOW 20 MIN: CPT | Performed by: NURSE PRACTITIONER

## 2021-09-22 PROCEDURE — U0003 INFECTIOUS AGENT DETECTION BY NUCLEIC ACID (DNA OR RNA); SEVERE ACUTE RESPIRATORY SYNDROME CORONAVIRUS 2 (SARS-COV-2) (CORONAVIRUS DISEASE [COVID-19]), AMPLIFIED PROBE TECHNIQUE, MAKING USE OF HIGH THROUGHPUT TECHNOLOGIES AS DESCRIBED BY CMS-2020-01-R: HCPCS

## 2021-09-22 ASSESSMENT — ENCOUNTER SYMPTOMS
SHORTNESS OF BREATH: 0
COUGH: 1
NAUSEA: 1
SORE THROAT: 1
DIARRHEA: 0
VOMITING: 0
FEVER: 0

## 2021-09-23 DIAGNOSIS — J06.9 VIRAL URI WITH COUGH: ICD-10-CM

## 2021-09-23 LAB — COVID ORDER STATUS COVID19: NORMAL

## 2021-09-23 ASSESSMENT — ENCOUNTER SYMPTOMS: MYALGIAS: 1

## 2021-09-23 NOTE — PROGRESS NOTES
Subjective:     Nallely Herrera is a 61 y.o. female who presents for Cough (cough since sunday night, no fever, body aches needs test for work. )      Started on Sunday night. Had COVID in Dec. Is Vaccinated. Hx of flem and nausea contributing to a cough.           Cough  This is a new problem. The current episode started in the past 7 days. The problem has been gradually improving. Associated symptoms include myalgias and a sore throat. Pertinent negatives include no fever or shortness of breath. Nothing aggravates the symptoms.       History reviewed. No pertinent past medical history.    Past Surgical History:   Procedure Laterality Date   • ABDOMINAL HYSTERECTOMY TOTAL  2006    AUB- still has ovaries.        Social History     Socioeconomic History   • Marital status:      Spouse name: Not on file   • Number of children: Not on file   • Years of education: Not on file   • Highest education level: Not on file   Occupational History   • Not on file   Tobacco Use   • Smoking status: Never Smoker   • Smokeless tobacco: Never Used   Substance and Sexual Activity   • Alcohol use: Yes     Comment: Occasionally   • Drug use: Yes     Types: Marijuana     Comment: cbd oil   • Sexual activity: Not Currently   Other Topics Concern   • Not on file   Social History Narrative   • Not on file     Social Determinants of Health     Financial Resource Strain:    • Difficulty of Paying Living Expenses:    Food Insecurity:    • Worried About Running Out of Food in the Last Year:    • Ran Out of Food in the Last Year:    Transportation Needs:    • Lack of Transportation (Medical):    • Lack of Transportation (Non-Medical):    Physical Activity:    • Days of Exercise per Week:    • Minutes of Exercise per Session:    Stress:    • Feeling of Stress :    Social Connections:    • Frequency of Communication with Friends and Family:    • Frequency of Social Gatherings with Friends and Family:    • Attends Christianity Services:   "  • Active Member of Clubs or Organizations:    • Attends Club or Organization Meetings:    • Marital Status:    Intimate Partner Violence:    • Fear of Current or Ex-Partner:    • Emotionally Abused:    • Physically Abused:    • Sexually Abused:         Family History   Problem Relation Age of Onset   • Stroke Mother    • Cancer Father         prostate ca   • Heart Disease Father         angina   • Diabetes Neg Hx    • Hypertension Neg Hx    • Hyperlipidemia Neg Hx         No Known Allergies    Review of Systems   Constitutional: Positive for malaise/fatigue. Negative for fever.   HENT: Positive for congestion and sore throat.    Respiratory: Positive for cough. Negative for shortness of breath.    Gastrointestinal: Positive for nausea. Negative for diarrhea and vomiting.   Musculoskeletal: Positive for myalgias.   All other systems reviewed and are negative.       Objective:   /62   Pulse 84   Temp 37 °C (98.6 °F) (Temporal)   Resp 18   Ht 1.651 m (5' 5\")   Wt 65.8 kg (145 lb)   SpO2 97%   BMI 24.13 kg/m²     Physical Exam  Vitals reviewed.   Constitutional:       General: She is not in acute distress.     Appearance: She is well-developed. She is not toxic-appearing.   HENT:      Head: Normocephalic and atraumatic.      Right Ear: External ear normal. No drainage, swelling or tenderness. Tympanic membrane is not erythematous.      Left Ear: External ear normal. No drainage, swelling or tenderness. A middle ear effusion is present. There is no impacted cerumen. No mastoid tenderness. No hemotympanum. Tympanic membrane is not injected, perforated, erythematous, retracted or bulging.      Ears:      Comments: Part cerumen occlusion right ear, clear effusion left.      Nose: Mucosal edema present.      Mouth/Throat:      Lips: Pink.      Mouth: Mucous membranes are moist.      Pharynx: Oropharynx is clear.   Eyes:      Conjunctiva/sclera: Conjunctivae normal.   Cardiovascular:      Rate and Rhythm: " Normal rate.   Pulmonary:      Effort: Pulmonary effort is normal. No respiratory distress.      Breath sounds: Normal breath sounds. No stridor. No wheezing, rhonchi or rales.   Musculoskeletal:         General: Normal range of motion.      Cervical back: Normal range of motion.   Skin:     General: Skin is warm and dry.      Findings: No rash.   Neurological:      General: No focal deficit present.      Mental Status: She is alert and oriented to person, place, and time.      GCS: GCS eye subscore is 4. GCS verbal subscore is 5. GCS motor subscore is 6.   Psychiatric:         Mood and Affect: Mood normal.         Speech: Speech normal.         Behavior: Behavior normal.         Thought Content: Thought content normal.         Judgment: Judgment normal.         Assessment/Plan:   1. Viral URI with cough  - SARS-CoV-2 PCR (24 hour In-House): Collect [facility] swab in [facility]; Future  Symptomatic care.  -Oral hydration and rest.   -Cough control: nonpharmacologic options for cough relief such as throat lozenges, hot tea, honey.  -Over the counter expectorant as directed; Guaifenesin (Mucinex).  -Tylenol or ibuprofen for pain and fever as directed.     Seek emergency medical care immediately for: Trouble breathing, persistent pain or pressure in the chest, confusion, inability to wake or stay awake, bluish lips or face, persistent tachycardia (fast heart rate), prolonged dizziness, persistent high grade fevers. Follow up for prolonged cough, persistent wheezing, leg swelling, or any other concerns. Follow up with PCP.     -Discussed viral etiology. COVID S&S, and self isolation guidelines. S&S of PNA with follow up.     Differential diagnosis, natural history, supportive care, and indications for immediate follow-up discussed.

## 2021-09-23 NOTE — PATIENT INSTRUCTIONS
Symptomatic care.  -Oral hydration and rest.   -Cough control: nonpharmacologic options for cough relief such as throat lozenges, hot tea, honey.  -Over the counter expectorant as directed; Guaifenesin (Mucinex).  -Tylenol or ibuprofen for pain and fever as directed.     Seek emergency medical care immediately for: Trouble breathing, persistent pain or pressure in the chest, confusion, inability to wake or stay awake, bluish lips or face, persistent tachycardia (fast heart rate), prolonged dizziness, persistent high grade fevers. Follow up for prolonged cough, persistent wheezing, leg swelling, or any other concerns. Follow up with PCP.       Viral Respiratory Infection  A respiratory infection is an illness that affects part of the respiratory system, such as the lungs, nose, or throat. A respiratory infection that is caused by a virus is called a viral respiratory infection.  Common types of viral respiratory infections include:  · A cold.  · The flu (influenza).  · A respiratory syncytial virus (RSV) infection.  What are the causes?  This condition is caused by a virus.  What are the signs or symptoms?  Symptoms of this condition include:  · A stuffy or runny nose.  · Yellow or green nasal discharge.  · A cough.  · Sneezing.  · Fatigue.  · Achy muscles.  · A sore throat.  · Sweating or chills.  · A fever.  · A headache.  How is this diagnosed?  This condition may be diagnosed based on:  · Your symptoms.  · A physical exam.  · Testing of nasal swabs.  How is this treated?  This condition may be treated with medicines, such as:  · Antiviral medicine. This may shorten the length of time a person has symptoms.  · Expectorants. These make it easier to cough up mucus.  · Decongestant nasal sprays.  · Acetaminophen or NSAIDs to relieve fever and pain.  Antibiotic medicines are not prescribed for viral infections. This is because antibiotics are designed to kill bacteria. They are not effective against viruses.  Follow  these instructions at home:    Managing pain and congestion  · Take over-the-counter and prescription medicines only as told by your health care provider.  · If you have a sore throat, gargle with a salt-water mixture 3-4 times a day or as needed. To make a salt-water mixture, completely dissolve ½-1 tsp of salt in 1 cup of warm water.  · Use nose drops made from salt water to ease congestion and soften raw skin around your nose.  · Drink enough fluid to keep your urine pale yellow. This helps prevent dehydration and helps loosen up mucus.  General instructions  · Rest as much as possible.  · Do not drink alcohol.  · Do not use any products that contain nicotine or tobacco, such as cigarettes and e-cigarettes. If you need help quitting, ask your health care provider.  · Keep all follow-up visits as told by your health care provider. This is important.  How is this prevented?    · Get an annual flu shot. You may get the flu shot in late summer, fall, or winter. Ask your health care provider when you should get your flu shot.  · Avoid exposing others to your respiratory infection.  ? Stay home from work or school as told by your health care provider.  ? Wash your hands with soap and water often, especially after you cough or sneeze. If soap and water are not available, use alcohol-based hand .  · Avoid contact with people who are sick during cold and flu season. This is generally fall and winter.  Contact a health care provider if:  · Your symptoms last for 10 days or longer.  · Your symptoms get worse over time.  · You have a fever.  · You have severe sinus pain in your face or forehead.  · The glands in your jaw or neck become very swollen.  Get help right away if you:  · Feel pain or pressure in your chest.  · Have shortness of breath.  · Faint or feel like you will faint.  · Have severe and persistent vomiting.  · Feel confused or disoriented.  Summary  · A respiratory infection is an illness that affects  part of the respiratory system, such as the lungs, nose, or throat. A respiratory infection that is caused by a virus is called a viral respiratory infection.  · Common types of viral respiratory infections are a cold, influenza, and respiratory syncytial virus (RSV) infection.  · Symptoms of this condition include a stuffy or runny nose, cough, sneezing, fatigue, achy muscles, sore throat, and fevers or chills.  · Antibiotic medicines are not prescribed for viral infections. This is because antibiotics are designed to kill bacteria. They are not effective against viruses.  This information is not intended to replace advice given to you by your health care provider. Make sure you discuss any questions you have with your health care provider.  Document Released: 09/27/2006 Document Revised: 12/26/2019 Document Reviewed: 01/28/2019  [employer] Patient Education © 2020 [employer]      COVID-19  COVID-19 is a respiratory infection that is caused by a virus called severe acute respiratory syndrome coronavirus 2 (SARS-CoV-2). The disease is also known as coronavirus disease or novel coronavirus. In some people, the virus may not cause any symptoms. In others, it may cause a serious infection. The infection can get worse quickly and can lead to complications, such as:  · Pneumonia, or infection of the lungs.  · Acute respiratory distress syndrome or ARDS. This is fluid build-up in the lungs.  · Acute respiratory failure. This is a condition in which there is not enough oxygen passing from the lungs to the body.  · Sepsis or septic shock. This is a serious bodily reaction to an infection.  · Blood clotting problems.  · Secondary infections due to bacteria or fungus.  The virus that causes COVID-19 is contagious. This means that it can spread from person to person through droplets from coughs and sneezes (respiratory secretions).  What are the causes?  This illness is caused by a virus. You may catch the virus  by:  · Breathing in droplets from an infected person's cough or sneeze.  · Touching something, like a table or a doorknob, that was exposed to the virus (contaminated) and then touching your mouth, nose, or eyes.  What increases the risk?  Risk for infection  You are more likely to be infected with this virus if you:  · Live in or travel to an area with a COVID-19 outbreak.  · Come in contact with a sick person who recently traveled to an area with a COVID-19 outbreak.  · Provide care for or live with a person who is infected with COVID-19.  Risk for serious illness  You are more likely to become seriously ill from the virus if you:  · Are 65 years of age or older.  · Have a long-term disease that lowers your body's ability to fight infection (immunocompromised).  · Live in a nursing home or long-term care facility.  · Have a long-term (chronic) disease such as:  ? Chronic lung disease, including chronic obstructive pulmonary disease or asthma  ? Heart disease.  ? Diabetes.  ? Chronic kidney disease.  ? Liver disease.  · Are obese.  What are the signs or symptoms?  Symptoms of this condition can range from mild to severe. Symptoms may appear any time from 2 to 14 days after being exposed to the virus. They include:  · A fever.  · A cough.  · Difficulty breathing.  · Chills.  · Muscle pains.  · A sore throat.  · Loss of taste or smell.  Some people may also have stomach problems, such as nausea, vomiting, or diarrhea.  Other people may not have any symptoms of COVID-19.  How is this diagnosed?  This condition may be diagnosed based on:  · Your signs and symptoms, especially if:  ? You live in an area with a COVID-19 outbreak.  ? You recently traveled to or from an area where the virus is common.  ? You provide care for or live with a person who was diagnosed with COVID-19.  · A physical exam.  · Lab tests, which may include:  ? A nasal swab to take a sample of fluid from your nose.  ? A throat swab to take a sample  of fluid from your throat.  ? A sample of mucus from your lungs (sputum).  ? Blood tests.  · Imaging tests, which may include, X-rays, CT scan, or ultrasound.  How is this treated?  At present, there is no medicine to treat COVID-19. Medicines that treat other diseases are being used on a trial basis to see if they are effective against COVID-19.  Your health care provider will talk with you about ways to treat your symptoms. For most people, the infection is mild and can be managed at home with rest, fluids, and over-the-counter medicines.  Treatment for a serious infection usually takes places in a hospital intensive care unit (ICU). It may include one or more of the following treatments. These treatments are given until your symptoms improve.  · Receiving fluids and medicines through an IV.  · Supplemental oxygen. Extra oxygen is given through a tube in the nose, a face mask, or a rea.  · Positioning you to lie on your stomach (prone position). This makes it easier for oxygen to get into the lungs.  · Continuous positive airway pressure (CPAP) or bi-level positive airway pressure (BPAP) machine. This treatment uses mild air pressure to keep the airways open. A tube that is connected to a motor delivers oxygen to the body.  · Ventilator. This treatment moves air into and out of the lungs by using a tube that is placed in your windpipe.  · Tracheostomy. This is a procedure to create a hole in the neck so that a breathing tube can be inserted.  · Extracorporeal membrane oxygenation (ECMO). This procedure gives the lungs a chance to recover by taking over the functions of the heart and lungs. It supplies oxygen to the body and removes carbon dioxide.  Follow these instructions at home:  Lifestyle  · If you are sick, stay home except to get medical care. Your health care provider will tell you how long to stay home. Call your health care provider before you go for medical care.  · Rest at home as told by your health  care provider.  · Do not use any products that contain nicotine or tobacco, such as cigarettes, e-cigarettes, and chewing tobacco. If you need help quitting, ask your health care provider.  · Return to your normal activities as told by your health care provider. Ask your health care provider what activities are safe for you.  General instructions  · Take over-the-counter and prescription medicines only as told by your health care provider.  · Drink enough fluid to keep your urine pale yellow.  · Keep all follow-up visits as told by your health care provider. This is important.  How is this prevented?    There is no vaccine to help prevent COVID-19 infection. However, there are steps you can take to protect yourself and others from this virus.  To protect yourself:   · Do not travel to areas where COVID-19 is a risk. The areas where COVID-19 is reported change often. To identify high-risk areas and travel restrictions, check the [facility] travel website: wwwnc.cdc.gov/travel/notices  · If you live in, or must travel to, an area where COVID-19 is a risk, take precautions to avoid infection.  ? Stay away from people who are sick.  ? Wash your hands often with soap and water for 20 seconds. If soap and water are not available, use an alcohol-based hand .  ? Avoid touching your mouth, face, eyes, or nose.  ? Avoid going out in public, follow guidance from your state and local health authorities.  ? If you must go out in public, wear a cloth face covering or face mask.  ? Disinfect objects and surfaces that are frequently touched every day. This may include:  § Counters and tables.  § Doorknobs and light switches.  § Sinks and faucets.  § Electronics, such as phones, remote controls, keyboards, computers, and tablets.  To protect others:  If you have symptoms of COVID-19, take steps to prevent the virus from spreading to others.  · If you think you have a COVID-19 infection, contact your health care provider right  away. Tell your health care team that you think you may have a COVID-19 infection.  · Stay home. Leave your house only to seek medical care. Do not use public transport.  · Do not travel while you are sick.  · Wash your hands often with soap and water for 20 seconds. If soap and water are not available, use alcohol-based hand .  · Stay away from other members of your household. Let healthy household members care for children and pets, if possible. If you have to care for children or pets, wash your hands often and wear a mask. If possible, stay in your own room, separate from others. Use a different bathroom.  · Make sure that all people in your household wash their hands well and often.  · Cough or sneeze into a tissue or your sleeve or elbow. Do not cough or sneeze into your hand or into the air.  · Wear a cloth face covering or face mask.  Where to find more information  · Centers for Disease Control and Prevention: www.cdc.gov/coronavirus/2019-ncov/index.html  · World Health Organization: www.who.int/health-topics/coronavirus  Contact a health care provider if:  · You live in or have traveled to an area where COVID-19 is a risk and you have symptoms of the infection.  · You have had contact with someone who has COVID-19 and you have symptoms of the infection.  Get help right away if:  · You have trouble breathing.  · You have pain or pressure in your chest.  · You have confusion.  · You have bluish lips and fingernails.  · You have difficulty waking from sleep.  · You have symptoms that get worse.  These symptoms may represent a serious problem that is an emergency. Do not wait to see if the symptoms will go away. Get medical help right away. Call your local emergency services (911 in the [place] Do not drive yourself to the hospital. Let the emergency medical personnel know if you think you have COVID-19.  Summary  · COVID-19 is a respiratory infection that is caused by a virus. It is also known as  coronavirus disease or novel coronavirus. It can cause serious infections, such as pneumonia, acute respiratory distress syndrome, acute respiratory failure, or sepsis.  · The virus that causes COVID-19 is contagious. This means that it can spread from person to person through droplets from coughs and sneezes.  · You are more likely to develop a serious illness if you are 65 years of age or older, have a weak immunity, live in a nursing home, or have chronic disease.  · There is no medicine to treat COVID-19. Your health care provider will talk with you about ways to treat your symptoms.  · Take steps to protect yourself and others from infection. Wash your hands often and disinfect objects and surfaces that are frequently touched every day. Stay away from people who are sick and wear a mask if you are sick.  This information is not intended to replace advice given to you by your health care provider. Make sure you discuss any questions you have with your health care provider.  Document Released: 01/23/2020 Document Revised: 05/14/2020 Document Reviewed: 01/23/2020  [employer] Patient Education © 2020 [employer]

## 2021-09-24 LAB
SARS-COV-2 RNA RESP QL NAA+PROBE: NOTDETECTED
SPECIMEN SOURCE: NORMAL

## 2021-11-05 ENCOUNTER — OFFICE VISIT (OUTPATIENT)
Dept: MEDICAL GROUP | Facility: LAB | Age: 61
End: 2021-11-05
Payer: COMMERCIAL

## 2021-11-05 VITALS
HEIGHT: 65 IN | RESPIRATION RATE: 12 BRPM | WEIGHT: 132 LBS | HEART RATE: 86 BPM | BODY MASS INDEX: 21.99 KG/M2 | DIASTOLIC BLOOD PRESSURE: 68 MMHG | TEMPERATURE: 98.2 F | SYSTOLIC BLOOD PRESSURE: 110 MMHG | OXYGEN SATURATION: 96 %

## 2021-11-05 DIAGNOSIS — Z11.59 NEED FOR HEPATITIS C SCREENING TEST: ICD-10-CM

## 2021-11-05 DIAGNOSIS — Z00.00 WELL WOMAN EXAM WITHOUT GYNECOLOGICAL EXAM: ICD-10-CM

## 2021-11-05 DIAGNOSIS — Z23 NEED FOR VACCINATION: ICD-10-CM

## 2021-11-05 DIAGNOSIS — Z12.31 ENCOUNTER FOR SCREENING MAMMOGRAM FOR MALIGNANT NEOPLASM OF BREAST: ICD-10-CM

## 2021-11-05 DIAGNOSIS — Z12.11 COLON CANCER SCREENING: ICD-10-CM

## 2021-11-05 PROBLEM — J18.9 MULTIFOCAL PNEUMONIA: Status: RESOLVED | Noted: 2018-09-30 | Resolved: 2021-11-05

## 2021-11-05 PROBLEM — J96.01 ACUTE RESPIRATORY FAILURE WITH HYPOXEMIA (HCC): Status: RESOLVED | Noted: 2018-09-30 | Resolved: 2021-11-05

## 2021-11-05 PROBLEM — R11.2 NAUSEA AND VOMITING: Status: RESOLVED | Noted: 2018-10-02 | Resolved: 2021-11-05

## 2021-11-05 PROBLEM — J10.1 INFLUENZA A: Status: RESOLVED | Noted: 2018-09-30 | Resolved: 2021-11-05

## 2021-11-05 PROCEDURE — 99396 PREV VISIT EST AGE 40-64: CPT | Mod: 25 | Performed by: FAMILY MEDICINE

## 2021-11-05 PROCEDURE — 90471 IMMUNIZATION ADMIN: CPT | Performed by: FAMILY MEDICINE

## 2021-11-05 PROCEDURE — 90686 IIV4 VACC NO PRSV 0.5 ML IM: CPT | Performed by: FAMILY MEDICINE

## 2021-11-05 ASSESSMENT — PATIENT HEALTH QUESTIONNAIRE - PHQ9: CLINICAL INTERPRETATION OF PHQ2 SCORE: 0

## 2021-11-05 NOTE — PROGRESS NOTES
Chief Complaint   Patient presents with   • Establish Care         Nallely Herrera is a 61 y.o. female here to establish care and for evaluation and management of:        HPI:    Adopting her grand son who   Is 2 1/2 years old. Needs physical for this.   Mild elevated cholesteorl in the past.     Works at [employer].     Diet: Whole foods, but some convenience foods. All food groups. No restrictions really. Crave sweet drinks once a month, that's it. Kombucha.   Exercise: Not as much due to work and life. Wants to get back into exercise.   Likes to ski, getting back into tennis. Tried Gym, but not liking it. Balance is a bit off   Sleep: Really well. No issues. Can get up easily with her grandson.     Last Mammo: never had one  Last pap: Prior to hysterectomy, due to fibroids, menorrhagia. No abnormal paps prior.   Last Colonoscopy: Colonoscopy prior to . This was 15 years ago.   Due.       No Known Allergies    Current medicines (including changes today)  No current outpatient medications on file.     No current facility-administered medications for this visit.     She  has no past medical history on file.  She  has a past surgical history that includes abdominal hysterectomy total ().  Social History     Tobacco Use   • Smoking status: Never Smoker   • Smokeless tobacco: Never Used   Substance Use Topics   • Alcohol use: Yes     Comment: Occasionally   • Drug use: Yes     Types: Marijuana     Comment: cbd oil     Social History     Social History Narrative   • Not on file     Family History   Problem Relation Age of Onset   • Stroke Mother    • Cancer Father         prostate ca   • Heart Disease Father         angina   • Diabetes Neg Hx    • Hypertension Neg Hx    • Hyperlipidemia Neg Hx      Family Status   Relation Name Status   • Mo 80yo    • Fa 75yo    • Sis  Alive   • Sis 1/2 Alive   • Nestor  Alive   • Son  Alive   • Neg Hx  (Not Specified)         ROS  No fever or  "chills.  No nausea or vomiting.  No chest pain or palpitations.  No cough or SOB.  No pain with urination or hematuria.  No black or bloody stools.  All other systems reviewed and are negative     Objective:     /68   Pulse 86   Temp 36.8 °C (98.2 °F)   Resp 12   Ht 1.651 m (5' 5\")   Wt 59.9 kg (132 lb)   SpO2 96%  Body mass index is 21.97 kg/m².  Physical Exam:      Well developed, well nourished.  Alert, oriented in no acute distress.  Psych: Eye contact is good, speech goal directed, affect calm  Eyes: conjunctiva non-injected, sclera non-icteric.  Ears: Pinna normal. TM pearly gray.   Nose: Nares are patent.  Normal mucosa  Mouth: Oral mucous membranes pink and moist with no lesions.  Neck Supple.  No adenopathy or masses in the neck or supraclavicular regions. No thyromegaly  Lungs: clear to auscultation bilaterally with good excursion. No wheezes or rhonchi  CV: regular rate and rhythm. No murmur  Abdomen: soft, nontender, no masses or organomegaly.  No rebound or gaurding  Ext: no edema, color normal, vascularity normal, temperature normal      Assessment and Plan:   The following treatment plan was discussed     1. Well woman exam without gynecological exam  Reviewed diet and exercise recommendations.  She would like to get better and exercise and work on cleaning up her diet a little bit.  She does have the motivation now she knows that she felt better when she was exercising and she wants to teach her grandson some good habits.  Discussed preventative services as well as labs ordered.  Also discussed routine screening colonoscopy and routine mammograms.  These are ordered and she will also get a flu shot today.  Discussed shingles vaccine and she will get this at the pharmacy.  Paperwork filled out today for [facility] for adoption of her grandson.  - CBC WITH DIFFERENTIAL; Future  - Comp Metabolic Panel; Future  - Lipid Profile; Future    2. Encounter for screening mammogram for malignant " neoplasm of breast    - MA-SCREENING MAMMO BILAT W/TOMOSYNTHESIS W/CAD; Future    3. Colon cancer screening    - Referral to GI for Colonoscopy    4. Need for vaccination    - INFLUENZA VACCINE QUAD INJ (PF)    5. Need for hepatitis C screening test    - HEP C VIRUS ANTIBODY; Future      Records requested.        Followup: No follow-ups on file.